# Patient Record
Sex: MALE | Race: WHITE | NOT HISPANIC OR LATINO | Employment: FULL TIME | URBAN - METROPOLITAN AREA
[De-identification: names, ages, dates, MRNs, and addresses within clinical notes are randomized per-mention and may not be internally consistent; named-entity substitution may affect disease eponyms.]

---

## 2017-02-09 ENCOUNTER — AMBULATORY - HEALTHEAST (OUTPATIENT)
Dept: ADMINISTRATIVE | Facility: REHABILITATION | Age: 52
End: 2017-02-09

## 2017-02-09 DIAGNOSIS — M25.571 RIGHT ANKLE PAIN: ICD-10-CM

## 2017-02-13 ENCOUNTER — OFFICE VISIT - HEALTHEAST (OUTPATIENT)
Dept: PHYSICAL THERAPY | Facility: REHABILITATION | Age: 52
End: 2017-02-13

## 2017-02-13 DIAGNOSIS — R26.89 GAIT, ANTALGIC: ICD-10-CM

## 2017-02-13 DIAGNOSIS — M25.571 RIGHT ANKLE PAIN: ICD-10-CM

## 2017-02-13 DIAGNOSIS — M25.571 ACUTE RIGHT ANKLE PAIN: ICD-10-CM

## 2017-02-13 DIAGNOSIS — M25.471 RIGHT ANKLE SWELLING: ICD-10-CM

## 2017-02-13 DIAGNOSIS — R29.898 ANKLE WEAKNESS: ICD-10-CM

## 2017-02-13 DIAGNOSIS — M25.673 DECREASED ROM OF ANKLE: ICD-10-CM

## 2017-02-16 ENCOUNTER — OFFICE VISIT - HEALTHEAST (OUTPATIENT)
Dept: PHYSICAL THERAPY | Facility: REHABILITATION | Age: 52
End: 2017-02-16

## 2017-02-16 DIAGNOSIS — M25.673 DECREASED ROM OF ANKLE: ICD-10-CM

## 2017-02-16 DIAGNOSIS — R29.898 ANKLE WEAKNESS: ICD-10-CM

## 2017-02-16 DIAGNOSIS — R26.89 GAIT, ANTALGIC: ICD-10-CM

## 2017-02-16 DIAGNOSIS — M25.471 RIGHT ANKLE SWELLING: ICD-10-CM

## 2017-02-16 DIAGNOSIS — M25.571 ACUTE RIGHT ANKLE PAIN: ICD-10-CM

## 2017-02-21 ENCOUNTER — OFFICE VISIT - HEALTHEAST (OUTPATIENT)
Dept: PHYSICAL THERAPY | Facility: REHABILITATION | Age: 52
End: 2017-02-21

## 2017-02-21 DIAGNOSIS — R29.898 ANKLE WEAKNESS: ICD-10-CM

## 2017-02-21 DIAGNOSIS — M25.673 DECREASED ROM OF ANKLE: ICD-10-CM

## 2017-02-21 DIAGNOSIS — R26.89 GAIT, ANTALGIC: ICD-10-CM

## 2017-02-21 DIAGNOSIS — M25.471 RIGHT ANKLE SWELLING: ICD-10-CM

## 2017-02-21 DIAGNOSIS — M25.571 ACUTE RIGHT ANKLE PAIN: ICD-10-CM

## 2017-02-23 ENCOUNTER — OFFICE VISIT - HEALTHEAST (OUTPATIENT)
Dept: PHYSICAL THERAPY | Facility: REHABILITATION | Age: 52
End: 2017-02-23

## 2017-02-23 DIAGNOSIS — M25.471 RIGHT ANKLE SWELLING: ICD-10-CM

## 2017-02-23 DIAGNOSIS — R26.89 GAIT, ANTALGIC: ICD-10-CM

## 2017-02-23 DIAGNOSIS — R29.898 ANKLE WEAKNESS: ICD-10-CM

## 2017-02-23 DIAGNOSIS — M25.571 ACUTE RIGHT ANKLE PAIN: ICD-10-CM

## 2017-02-23 DIAGNOSIS — M25.673 DECREASED ROM OF ANKLE: ICD-10-CM

## 2017-03-02 ENCOUNTER — OFFICE VISIT - HEALTHEAST (OUTPATIENT)
Dept: PHYSICAL THERAPY | Facility: REHABILITATION | Age: 52
End: 2017-03-02

## 2017-03-02 DIAGNOSIS — R26.89 GAIT, ANTALGIC: ICD-10-CM

## 2017-03-02 DIAGNOSIS — M25.673 DECREASED ROM OF ANKLE: ICD-10-CM

## 2017-03-02 DIAGNOSIS — M25.471 RIGHT ANKLE SWELLING: ICD-10-CM

## 2017-03-02 DIAGNOSIS — M25.571 ACUTE RIGHT ANKLE PAIN: ICD-10-CM

## 2017-03-02 DIAGNOSIS — R29.898 ANKLE WEAKNESS: ICD-10-CM

## 2017-03-09 ENCOUNTER — OFFICE VISIT - HEALTHEAST (OUTPATIENT)
Dept: PHYSICAL THERAPY | Facility: REHABILITATION | Age: 52
End: 2017-03-09

## 2017-03-09 DIAGNOSIS — R29.898 ANKLE WEAKNESS: ICD-10-CM

## 2017-03-09 DIAGNOSIS — M25.571 ACUTE RIGHT ANKLE PAIN: ICD-10-CM

## 2017-03-09 DIAGNOSIS — R26.89 GAIT, ANTALGIC: ICD-10-CM

## 2017-03-09 DIAGNOSIS — M25.471 RIGHT ANKLE SWELLING: ICD-10-CM

## 2017-03-09 DIAGNOSIS — M25.673 DECREASED ROM OF ANKLE: ICD-10-CM

## 2017-03-13 ENCOUNTER — OFFICE VISIT - HEALTHEAST (OUTPATIENT)
Dept: FAMILY MEDICINE | Facility: CLINIC | Age: 52
End: 2017-03-13

## 2017-03-13 DIAGNOSIS — I10 ESSENTIAL HYPERTENSION: ICD-10-CM

## 2017-03-16 ENCOUNTER — OFFICE VISIT - HEALTHEAST (OUTPATIENT)
Dept: PHYSICAL THERAPY | Facility: REHABILITATION | Age: 52
End: 2017-03-16

## 2017-03-16 DIAGNOSIS — M25.673 DECREASED ROM OF ANKLE: ICD-10-CM

## 2017-03-16 DIAGNOSIS — M25.571 ACUTE RIGHT ANKLE PAIN: ICD-10-CM

## 2017-03-16 DIAGNOSIS — R29.898 ANKLE WEAKNESS: ICD-10-CM

## 2017-03-16 DIAGNOSIS — M25.471 RIGHT ANKLE SWELLING: ICD-10-CM

## 2017-03-16 DIAGNOSIS — R26.89 GAIT, ANTALGIC: ICD-10-CM

## 2017-04-04 ENCOUNTER — OFFICE VISIT - HEALTHEAST (OUTPATIENT)
Dept: PHYSICAL THERAPY | Facility: REHABILITATION | Age: 52
End: 2017-04-04

## 2017-04-04 DIAGNOSIS — M25.471 RIGHT ANKLE SWELLING: ICD-10-CM

## 2017-04-04 DIAGNOSIS — M25.673 DECREASED ROM OF ANKLE: ICD-10-CM

## 2017-04-04 DIAGNOSIS — R29.898 ANKLE WEAKNESS: ICD-10-CM

## 2017-04-04 DIAGNOSIS — M25.571 ACUTE RIGHT ANKLE PAIN: ICD-10-CM

## 2017-04-04 DIAGNOSIS — R26.89 GAIT, ANTALGIC: ICD-10-CM

## 2017-06-07 ENCOUNTER — COMMUNICATION - HEALTHEAST (OUTPATIENT)
Dept: FAMILY MEDICINE | Facility: CLINIC | Age: 52
End: 2017-06-07

## 2017-06-07 ENCOUNTER — AMBULATORY - HEALTHEAST (OUTPATIENT)
Dept: FAMILY MEDICINE | Facility: CLINIC | Age: 52
End: 2017-06-07

## 2017-06-07 DIAGNOSIS — I10 ESSENTIAL HYPERTENSION: ICD-10-CM

## 2017-08-29 ENCOUNTER — OFFICE VISIT - HEALTHEAST (OUTPATIENT)
Dept: FAMILY MEDICINE | Facility: CLINIC | Age: 52
End: 2017-08-29

## 2017-08-29 DIAGNOSIS — I10 ESSENTIAL HYPERTENSION: ICD-10-CM

## 2017-09-14 ENCOUNTER — OFFICE VISIT - HEALTHEAST (OUTPATIENT)
Dept: FAMILY MEDICINE | Facility: CLINIC | Age: 52
End: 2017-09-14

## 2017-09-14 DIAGNOSIS — S61.213A LACERATION OF LEFT MIDDLE FINGER: ICD-10-CM

## 2018-09-17 ENCOUNTER — COMMUNICATION - HEALTHEAST (OUTPATIENT)
Dept: FAMILY MEDICINE | Facility: CLINIC | Age: 53
End: 2018-09-17

## 2018-09-17 DIAGNOSIS — I10 ESSENTIAL HYPERTENSION: ICD-10-CM

## 2018-10-25 ENCOUNTER — OFFICE VISIT - HEALTHEAST (OUTPATIENT)
Dept: FAMILY MEDICINE | Facility: CLINIC | Age: 53
End: 2018-10-25

## 2018-10-25 DIAGNOSIS — I10 HTN (HYPERTENSION): ICD-10-CM

## 2018-10-25 DIAGNOSIS — Z12.5 SCREENING FOR PROSTATE CANCER: ICD-10-CM

## 2018-10-31 ENCOUNTER — AMBULATORY - HEALTHEAST (OUTPATIENT)
Dept: LAB | Facility: CLINIC | Age: 53
End: 2018-10-31

## 2018-10-31 DIAGNOSIS — Z12.5 SCREENING FOR PROSTATE CANCER: ICD-10-CM

## 2018-10-31 DIAGNOSIS — I10 HTN (HYPERTENSION): ICD-10-CM

## 2018-10-31 LAB
ALBUMIN SERPL-MCNC: 4.3 G/DL (ref 3.5–5)
ALP SERPL-CCNC: 67 U/L (ref 45–120)
ALT SERPL W P-5'-P-CCNC: 19 U/L (ref 0–45)
ANION GAP SERPL CALCULATED.3IONS-SCNC: 11 MMOL/L (ref 5–18)
AST SERPL W P-5'-P-CCNC: 18 U/L (ref 0–40)
BILIRUB SERPL-MCNC: 1.3 MG/DL (ref 0–1)
BUN SERPL-MCNC: 13 MG/DL (ref 8–22)
CALCIUM SERPL-MCNC: 9.9 MG/DL (ref 8.5–10.5)
CHLORIDE BLD-SCNC: 105 MMOL/L (ref 98–107)
CHOLEST SERPL-MCNC: 191 MG/DL
CO2 SERPL-SCNC: 25 MMOL/L (ref 22–31)
CREAT SERPL-MCNC: 1.18 MG/DL (ref 0.7–1.3)
FASTING STATUS PATIENT QL REPORTED: YES
GFR SERPL CREATININE-BSD FRML MDRD: >60 ML/MIN/1.73M2
GLUCOSE BLD-MCNC: 95 MG/DL (ref 70–125)
HDLC SERPL-MCNC: 50 MG/DL
LDLC SERPL CALC-MCNC: 122 MG/DL
POTASSIUM BLD-SCNC: 4.4 MMOL/L (ref 3.5–5)
PROT SERPL-MCNC: 6.9 G/DL (ref 6–8)
PSA SERPL-MCNC: 0.9 NG/ML (ref 0–3.5)
SODIUM SERPL-SCNC: 141 MMOL/L (ref 136–145)
TRIGL SERPL-MCNC: 93 MG/DL

## 2018-12-21 ENCOUNTER — COMMUNICATION - HEALTHEAST (OUTPATIENT)
Dept: FAMILY MEDICINE | Facility: CLINIC | Age: 53
End: 2018-12-21

## 2018-12-21 DIAGNOSIS — I10 ESSENTIAL HYPERTENSION: ICD-10-CM

## 2019-03-22 ENCOUNTER — COMMUNICATION - HEALTHEAST (OUTPATIENT)
Dept: FAMILY MEDICINE | Facility: CLINIC | Age: 54
End: 2019-03-22

## 2019-03-22 DIAGNOSIS — I10 ESSENTIAL HYPERTENSION: ICD-10-CM

## 2019-06-19 ENCOUNTER — COMMUNICATION - HEALTHEAST (OUTPATIENT)
Dept: FAMILY MEDICINE | Facility: CLINIC | Age: 54
End: 2019-06-19

## 2019-06-19 DIAGNOSIS — I10 ESSENTIAL HYPERTENSION: ICD-10-CM

## 2019-10-07 ENCOUNTER — COMMUNICATION - HEALTHEAST (OUTPATIENT)
Dept: FAMILY MEDICINE | Facility: CLINIC | Age: 54
End: 2019-10-07

## 2019-10-07 DIAGNOSIS — I10 ESSENTIAL HYPERTENSION: ICD-10-CM

## 2019-10-21 ENCOUNTER — OFFICE VISIT - HEALTHEAST (OUTPATIENT)
Dept: FAMILY MEDICINE | Facility: CLINIC | Age: 54
End: 2019-10-21

## 2019-10-21 ENCOUNTER — COMMUNICATION - HEALTHEAST (OUTPATIENT)
Dept: FAMILY MEDICINE | Facility: CLINIC | Age: 54
End: 2019-10-21
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
## 2019-10-21 DIAGNOSIS — Z13.228 SCREENING FOR METABOLIC DISORDER: ICD-10-CM

## 2019-10-21 DIAGNOSIS — Z12.5 SCREENING FOR PROSTATE CANCER: ICD-10-CM

## 2019-10-21 DIAGNOSIS — I10 ESSENTIAL HYPERTENSION: ICD-10-CM

## 2019-10-21 LAB
ANION GAP SERPL CALCULATED.3IONS-SCNC: 9 MMOL/L (ref 5–18)
BUN SERPL-MCNC: 14 MG/DL (ref 8–22)
CALCIUM SERPL-MCNC: 9.8 MG/DL (ref 8.5–10.5)
CHLORIDE BLD-SCNC: 107 MMOL/L (ref 98–107)
CHOLEST SERPL-MCNC: 218 MG/DL
CO2 SERPL-SCNC: 27 MMOL/L (ref 22–31)
CREAT SERPL-MCNC: 1.13 MG/DL (ref 0.7–1.3)
FASTING STATUS PATIENT QL REPORTED: ABNORMAL
GFR SERPL CREATININE-BSD FRML MDRD: >60 ML/MIN/1.73M2
GLUCOSE BLD-MCNC: 93 MG/DL (ref 70–125)
HBA1C MFR BLD: 5.2 % (ref 3.5–6)
HDLC SERPL-MCNC: 43 MG/DL
LDLC SERPL CALC-MCNC: 149 MG/DL
POTASSIUM BLD-SCNC: 4.3 MMOL/L (ref 3.5–5)
PSA SERPL-MCNC: 0.8 NG/ML (ref 0–3.5)
SODIUM SERPL-SCNC: 143 MMOL/L (ref 136–145)
TRIGL SERPL-MCNC: 132 MG/DL

## 2019-10-21 ASSESSMENT — ANXIETY QUESTIONNAIRES
2. NOT BEING ABLE TO STOP OR CONTROL WORRYING: NOT AT ALL
4. TROUBLE RELAXING: NOT AT ALL
IF YOU CHECKED OFF ANY PROBLEMS ON THIS QUESTIONNAIRE, HOW DIFFICULT HAVE THESE PROBLEMS MADE IT FOR YOU TO DO YOUR WORK, TAKE CARE OF THINGS AT HOME, OR GET ALONG WITH OTHER PEOPLE: NOT DIFFICULT AT ALL
6. BECOMING EASILY ANNOYED OR IRRITABLE: NOT AT ALL
5. BEING SO RESTLESS THAT IT IS HARD TO SIT STILL: NOT AT ALL
3. WORRYING TOO MUCH ABOUT DIFFERENT THINGS: NOT AT ALL
GAD7 TOTAL SCORE: 0
1. FEELING NERVOUS, ANXIOUS, OR ON EDGE: NOT AT ALL
7. FEELING AFRAID AS IF SOMETHING AWFUL MIGHT HAPPEN: NOT AT ALL

## 2019-10-21 ASSESSMENT — PATIENT HEALTH QUESTIONNAIRE - PHQ9: SUM OF ALL RESPONSES TO PHQ QUESTIONS 1-9: 0

## 2019-10-21 ASSESSMENT — MIFFLIN-ST. JEOR: SCORE: 1823.19

## 2019-10-22 ENCOUNTER — OFFICE VISIT - HEALTHEAST (OUTPATIENT)
Dept: FAMILY MEDICINE | Facility: CLINIC | Age: 54
End: 2019-10-22

## 2019-10-22 DIAGNOSIS — Z00.00 ROUTINE GENERAL MEDICAL EXAMINATION AT A HEALTH CARE FACILITY: ICD-10-CM

## 2019-10-22 DIAGNOSIS — Z23 TETANUS-DIPHTHERIA (TD) VACCINATION: ICD-10-CM

## 2019-10-22 LAB
HCV AB SERPL QL IA: NEGATIVE
HIV 1+2 AB+HIV1 P24 AG SERPL QL IA: NEGATIVE

## 2019-10-22 ASSESSMENT — MIFFLIN-ST. JEOR: SCORE: 1821.69

## 2019-10-24 LAB
B BURGDOR DNA SPEC QL NAA+PROBE: NOT DETECTED
SPECIMEN SOURCE: NORMAL

## 2019-12-10 ENCOUNTER — COMMUNICATION - HEALTHEAST (OUTPATIENT)
Dept: FAMILY MEDICINE | Facility: CLINIC | Age: 54
End: 2019-12-10

## 2019-12-10 DIAGNOSIS — I10 ESSENTIAL HYPERTENSION: ICD-10-CM

## 2020-02-10 ENCOUNTER — COMMUNICATION - HEALTHEAST (OUTPATIENT)
Dept: SCHEDULING | Facility: CLINIC | Age: 55
End: 2020-02-10

## 2020-02-10 ENCOUNTER — OFFICE VISIT - HEALTHEAST (OUTPATIENT)
Dept: FAMILY MEDICINE | Facility: CLINIC | Age: 55
End: 2020-02-10

## 2020-02-10 DIAGNOSIS — E78.49 OTHER HYPERLIPIDEMIA: ICD-10-CM

## 2020-02-10 DIAGNOSIS — J40 BRONCHITIS: ICD-10-CM

## 2020-02-10 LAB
FLUAV AG SPEC QL IA: NORMAL
FLUBV AG SPEC QL IA: NORMAL

## 2020-02-10 ASSESSMENT — MIFFLIN-ST. JEOR: SCORE: 1862.51

## 2020-02-10 NOTE — ASSESSMENT & PLAN NOTE
Patient currently with ASCVD score of over 8%.  Discussed with patient that we can look at further lowering blood pressure [id number], starting a moderate dose statin, working on weight loss and rechecking in 3 to 4 months.    Father  of MI at age 50 for which patient is now at the age of 54.  Discussed with patient to that this is not something to continue to sit on for the next 2 to 3 years.  He would like to try and go without medication.  Discussed avenues for weight loss and exercise to help reduce.  Also discussed the role of treatment now is to prevent problems in the next 5 to 10 years.    Additionally discussed with patient about possibly getting a CT cardiac coronary calcium score to help determine further risks given his status.  Patient will check with his insurance company.    Recommended patient move forward with his plans for weight loss and exercise now and follow-up with his PCP in 3 to 4 months.

## 2020-02-10 NOTE — ASSESSMENT & PLAN NOTE
Discussed symptomatic care currently.  Influenza tests are negative.  If symptoms are not improving or fevers not improving in 4 daysthen start Augmentin as per orders.

## 2020-11-18 ENCOUNTER — AMBULATORY - HEALTHEAST (OUTPATIENT)
Dept: FAMILY MEDICINE | Facility: CLINIC | Age: 55
End: 2020-11-18

## 2020-11-18 ENCOUNTER — OFFICE VISIT - HEALTHEAST (OUTPATIENT)
Dept: FAMILY MEDICINE | Facility: CLINIC | Age: 55
End: 2020-11-18

## 2020-11-18 DIAGNOSIS — Z20.822 SUSPECTED COVID-19 VIRUS INFECTION: ICD-10-CM

## 2020-11-20 ENCOUNTER — COMMUNICATION - HEALTHEAST (OUTPATIENT)
Dept: SCHEDULING | Facility: CLINIC | Age: 55
End: 2020-11-20

## 2020-12-07 ENCOUNTER — OFFICE VISIT - HEALTHEAST (OUTPATIENT)
Dept: FAMILY MEDICINE | Facility: CLINIC | Age: 55
End: 2020-12-07

## 2020-12-07 DIAGNOSIS — E66.01 MORBID OBESITY (H): ICD-10-CM

## 2020-12-07 DIAGNOSIS — E78.49 OTHER HYPERLIPIDEMIA: ICD-10-CM

## 2020-12-07 DIAGNOSIS — I10 ESSENTIAL HYPERTENSION: ICD-10-CM

## 2020-12-07 DIAGNOSIS — Z12.5 SCREENING FOR PROSTATE CANCER: ICD-10-CM

## 2020-12-07 DIAGNOSIS — Z00.00 ROUTINE GENERAL MEDICAL EXAMINATION AT A HEALTH CARE FACILITY: ICD-10-CM

## 2020-12-07 DIAGNOSIS — Z12.11 SPECIAL SCREENING FOR MALIGNANT NEOPLASMS, COLON: ICD-10-CM

## 2020-12-07 DIAGNOSIS — Z13.228 SCREENING FOR METABOLIC DISORDER: ICD-10-CM

## 2020-12-07 LAB
ANION GAP SERPL CALCULATED.3IONS-SCNC: 8 MMOL/L (ref 5–18)
BUN SERPL-MCNC: 11 MG/DL (ref 8–22)
CALCIUM SERPL-MCNC: 9.3 MG/DL (ref 8.5–10.5)
CHLORIDE BLD-SCNC: 105 MMOL/L (ref 98–107)
CHOLEST SERPL-MCNC: 209 MG/DL
CO2 SERPL-SCNC: 28 MMOL/L (ref 22–31)
CREAT SERPL-MCNC: 1.12 MG/DL (ref 0.7–1.3)
FASTING STATUS PATIENT QL REPORTED: YES
GFR SERPL CREATININE-BSD FRML MDRD: >60 ML/MIN/1.73M2
GLUCOSE BLD-MCNC: 100 MG/DL (ref 70–125)
HDLC SERPL-MCNC: 43 MG/DL
LDLC SERPL CALC-MCNC: 145 MG/DL
POTASSIUM BLD-SCNC: 4.8 MMOL/L (ref 3.5–5)
PSA SERPL-MCNC: 1 NG/ML (ref 0–3.5)
SODIUM SERPL-SCNC: 141 MMOL/L (ref 136–145)
TRIGL SERPL-MCNC: 104 MG/DL

## 2020-12-07 RX ORDER — AMLODIPINE BESYLATE 10 MG/1
10 TABLET ORAL DAILY
Qty: 90 TABLET | Refills: 4 | Status: SHIPPED | OUTPATIENT
Start: 2020-12-07 | End: 2022-03-08

## 2020-12-07 ASSESSMENT — ANXIETY QUESTIONNAIRES
3. WORRYING TOO MUCH ABOUT DIFFERENT THINGS: SEVERAL DAYS
2. NOT BEING ABLE TO STOP OR CONTROL WORRYING: NOT AT ALL
6. BECOMING EASILY ANNOYED OR IRRITABLE: SEVERAL DAYS
GAD7 TOTAL SCORE: 4
5. BEING SO RESTLESS THAT IT IS HARD TO SIT STILL: NOT AT ALL
1. FEELING NERVOUS, ANXIOUS, OR ON EDGE: SEVERAL DAYS
7. FEELING AFRAID AS IF SOMETHING AWFUL MIGHT HAPPEN: NOT AT ALL
IF YOU CHECKED OFF ANY PROBLEMS ON THIS QUESTIONNAIRE, HOW DIFFICULT HAVE THESE PROBLEMS MADE IT FOR YOU TO DO YOUR WORK, TAKE CARE OF THINGS AT HOME, OR GET ALONG WITH OTHER PEOPLE: SOMEWHAT DIFFICULT
4. TROUBLE RELAXING: SEVERAL DAYS

## 2020-12-07 ASSESSMENT — MIFFLIN-ST. JEOR: SCORE: 1848.79

## 2020-12-07 ASSESSMENT — PATIENT HEALTH QUESTIONNAIRE - PHQ9: SUM OF ALL RESPONSES TO PHQ QUESTIONS 1-9: 1

## 2021-02-08 ENCOUNTER — OFFICE VISIT - HEALTHEAST (OUTPATIENT)
Dept: FAMILY MEDICINE | Facility: CLINIC | Age: 56
End: 2021-02-08

## 2021-02-08 DIAGNOSIS — M25.531 RIGHT WRIST PAIN: ICD-10-CM

## 2021-02-08 DIAGNOSIS — M25.511 ACUTE PAIN OF RIGHT SHOULDER: ICD-10-CM

## 2021-02-08 ASSESSMENT — MIFFLIN-ST. JEOR: SCORE: 1878.28

## 2021-02-23 ENCOUNTER — RECORDS - HEALTHEAST (OUTPATIENT)
Dept: ADMINISTRATIVE | Facility: OTHER | Age: 56
End: 2021-02-23

## 2021-05-21 ENCOUNTER — RECORDS - HEALTHEAST (OUTPATIENT)
Dept: ADMINISTRATIVE | Facility: OTHER | Age: 56
End: 2021-05-21

## 2021-05-26 ASSESSMENT — PATIENT HEALTH QUESTIONNAIRE - PHQ9
SUM OF ALL RESPONSES TO PHQ QUESTIONS 1-9: 0
SUM OF ALL RESPONSES TO PHQ QUESTIONS 1-9: 1

## 2021-05-26 NOTE — TELEPHONE ENCOUNTER
Refill Approved    Rx renewed per Medication Renewal Policy. Medication was last renewed on 12/26/18  OV 10/25/18.    Cony Jones, [facility] Triage/Med Refill 3/23/2019     Requested Prescriptions   Pending Prescriptions Disp Refills     amLODIPine (NORVASC) 5 MG tablet [Pharmacy Med Name: AMLODIPINE 5MG TAB] 90 tablet 0     Sig: TAKE 1 TABLET BY MOUTH ONCE DAILY ,  NEED  ANNUAL  EXAM  FOR  FURTHER  REFILLS    Calcium-Channel Blockers Protocol Passed - 3/22/2019  7:22 AM       Passed - PCP or prescribing provider visit in past 12 months or next 3 months    Last office visit with prescriber/PCP: Visit date not found OR same dept: 10/25/2018 Jf Aguirre MD OR same specialty: 10/25/2018 Jf Aguirre MD  Last physical: Visit date not found Last MTM visit: Visit date not found   Next visit within 3 mo: Visit date not found  Next physical within 3 mo: Visit date not found  Prescriber OR PCP: Dahiana Scott MD  Last diagnosis associated with med order: 1. Essential hypertension  - amLODIPine (NORVASC) 5 MG tablet [Pharmacy Med Name: AMLODIPINE 5MG TAB]; TAKE 1 TABLET BY MOUTH ONCE DAILY ,  NEED  ANNUAL  EXAM  FOR  FURTHER  REFILLS  Dispense: 90 tablet; Refill: 0    If protocol passes may refill for 12 months if within 3 months of last provider visit (or a total of 15 months).            Passed - Blood pressure filed in past 12 months    BP Readings from Last 1 Encounters:   10/25/18 136/88

## 2021-05-28 ASSESSMENT — ANXIETY QUESTIONNAIRES
GAD7 TOTAL SCORE: 0
GAD7 TOTAL SCORE: 4

## 2021-05-29 NOTE — TELEPHONE ENCOUNTER
Refill Approved    Rx renewed per Medication Renewal Policy. Medication was last renewed on 3/24/19.    Ifrah Orlando, [facility] Triage/Med Refill 6/20/2019     Requested Prescriptions   Pending Prescriptions Disp Refills     amLODIPine (NORVASC) 5 MG tablet [Pharmacy Med Name: AMLODIPINE 5MG TAB] 90 tablet 0     Sig: TAKE 1 TABLET BY MOUTH ONCE DAILY . APPOINTMENT REQUIRED FOR FUTURE REFILLS       Calcium-Channel Blockers Protocol Passed - 6/19/2019  7:16 AM        Passed - PCP or prescribing provider visit in past 12 months or next 3 months     Last office visit with prescriber/PCP: 8/29/2017 Raheem Ríos MD OR same dept: 10/25/2018 Jf Aguirre MD OR same specialty: 10/25/2018 Jf Aguirre MD  Last physical: 12/12/2016 Last MTM visit: Visit date not found   Next visit within 3 mo: Visit date not found  Next physical within 3 mo: Visit date not found  Prescriber OR PCP: Raheem Ríos MD  Last diagnosis associated with med order: 1. Essential hypertension  - amLODIPine (NORVASC) 5 MG tablet [Pharmacy Med Name: AMLODIPINE 5MG TAB]; TAKE 1 TABLET BY MOUTH ONCE DAILY . APPOINTMENT REQUIRED FOR FUTURE REFILLS  Dispense: 90 tablet; Refill: 0    If protocol passes may refill for 12 months if within 3 months of last provider visit (or a total of 15 months).             Passed - Blood pressure filed in past 12 months     BP Readings from Last 1 Encounters:   10/25/18 136/88

## 2021-05-30 VITALS — WEIGHT: 242 LBS | BODY MASS INDEX: 37.9 KG/M2

## 2021-05-31 VITALS — WEIGHT: 231.2 LBS | BODY MASS INDEX: 36.21 KG/M2

## 2021-05-31 VITALS — WEIGHT: 229.5 LBS | BODY MASS INDEX: 35.94 KG/M2

## 2021-06-02 VITALS — WEIGHT: 231 LBS | BODY MASS INDEX: 36.18 KG/M2

## 2021-06-02 NOTE — TELEPHONE ENCOUNTER
RN cannot approve Refill Request    RN can NOT refill this medication PCP messaged that patient is overdue for six month f/u BP and Protocol failed and RN gave two month refill. Last office visit: 8/29/2017 Raheem Ríos MD Last Physical: 12/12/2016 Last MTM visit: Visit date not found Last visit same specialty: 10/25/2018 Jf Aguirre MD.  Next visit within 3 mo: Visit date not found  Next physical within 3 mo: Visit date not found  Last OV 10/25/2018  Last refill 6/20/2019 for 90/0 with note to be seen  Sonia Mcneill, [facility] Triage/Med Refill 10/9/2019    Requested Prescriptions   Pending Prescriptions Disp Refills     amLODIPine (NORVASC) 5 MG tablet [Pharmacy Med Name: AMLODIPINE 5MG TAB] 90 tablet 0     Sig: TAKE 1 TABLET BY MOUTH ONCE DAILY . APPOINTMENT REQUIRED FOR FUTURE REFILLS       Calcium-Channel Blockers Protocol Passed - 10/7/2019  1:35 PM        Passed - PCP or prescribing provider visit in past 12 months or next 3 months     Last office visit with prescriber/PCP: 8/29/2017 Raheem Ríos MD OR same dept: 10/25/2018 Jf Aguirre MD OR same specialty: 10/25/2018 Jf Aguirre MD  Last physical: 12/12/2016 Last MTM visit: Visit date not found   Next visit within 3 mo: Visit date not found  Next physical within 3 mo: Visit date not found  Prescriber OR PCP: Raheem Ríos MD  Last diagnosis associated with med order: 1. Essential hypertension  - amLODIPine (NORVASC) 5 MG tablet [Pharmacy Med Name: AMLODIPINE 5MG TAB]; TAKE 1 TABLET BY MOUTH ONCE DAILY . APPOINTMENT REQUIRED FOR FUTURE REFILLS  Dispense: 90 tablet; Refill: 0    If protocol passes may refill for 12 months if within 3 months of last provider visit (or a total of 15 months).             Passed - Blood pressure filed in past 12 months     BP Readings from Last 1 Encounters:   10/25/18 136/88

## 2021-06-02 NOTE — TELEPHONE ENCOUNTER
Medication Question or Clarification  Who is calling: Pharmacy: Franci from [facility]  What medication are you calling about? (include dose and sig)     doxycycline (MONODOX) 100 MG capsule 20 capsule 0 10/21/2019     Si tab to be taken X1      Who prescribed the medication?: Dahiana Scott MD  What is your question/concern?: Sig does not match the quantity, please advise!  Pharmacy: [facility]  Okay to leave a detailed message?: Yes  Site CMT - Please call the pharmacy to obtain any additional needed information.

## 2021-06-03 VITALS
WEIGHT: 226.4 LBS | DIASTOLIC BLOOD PRESSURE: 84 MMHG | HEIGHT: 67 IN | SYSTOLIC BLOOD PRESSURE: 128 MMHG | HEART RATE: 68 BPM | BODY MASS INDEX: 35.53 KG/M2

## 2021-06-03 VITALS
BODY MASS INDEX: 34.25 KG/M2 | WEIGHT: 226 LBS | SYSTOLIC BLOOD PRESSURE: 130 MMHG | DIASTOLIC BLOOD PRESSURE: 78 MMHG | HEART RATE: 66 BPM | HEIGHT: 68 IN | OXYGEN SATURATION: 98 %

## 2021-06-04 VITALS
HEIGHT: 68 IN | HEART RATE: 64 BPM | TEMPERATURE: 98.6 F | RESPIRATION RATE: 16 BRPM | DIASTOLIC BLOOD PRESSURE: 88 MMHG | SYSTOLIC BLOOD PRESSURE: 138 MMHG | WEIGHT: 235 LBS | OXYGEN SATURATION: 98 % | BODY MASS INDEX: 35.61 KG/M2

## 2021-06-04 NOTE — TELEPHONE ENCOUNTER
Refill Approved    Rx renewed per Medication Renewal Policy. Medication was last renewed on 10/9/19.    Eladia Rose, [facility] Triage/Med Refill 12/11/2019     Requested Prescriptions   Pending Prescriptions Disp Refills     amLODIPine (NORVASC) 5 MG tablet [Pharmacy Med Name: AMLODIPINE 5MG TAB] 30 tablet 1     Sig: TAKE 1 TABLET BY MOUTH ONCE DAILY. APPOINTMENT REQUIRED FOR FUTURE REFILLS       Calcium-Channel Blockers Protocol Passed - 12/10/2019  7:35 AM        Passed - PCP or prescribing provider visit in past 12 months or next 3 months     Last office visit with prescriber/PCP: 8/29/2017 Raheem Ríos MD OR same dept: 10/21/2019 Dahiana Scott MD OR same specialty: 10/21/2019 Dahiana Scott MD  Last physical: 10/22/2019 Last MTM visit: Visit date not found   Next visit within 3 mo: Visit date not found  Next physical within 3 mo: Visit date not found  Prescriber OR PCP: Raheem Ríos MD  Last diagnosis associated with med order: 1. Essential hypertension  - amLODIPine (NORVASC) 5 MG tablet [Pharmacy Med Name: AMLODIPINE 5MG TAB]; TAKE 1 TABLET BY MOUTH ONCE DAILY . APPOINTMENT REQUIRED FOR FUTURE REFILLS  Dispense: 30 tablet; Refill: 1    If protocol passes may refill for 12 months if within 3 months of last provider visit (or a total of 15 months).             Passed - Blood pressure filed in past 12 months     BP Readings from Last 1 Encounters:   10/22/19 130/78

## 2021-06-05 VITALS
WEIGHT: 237.6 LBS | HEIGHT: 68 IN | DIASTOLIC BLOOD PRESSURE: 84 MMHG | HEART RATE: 74 BPM | SYSTOLIC BLOOD PRESSURE: 130 MMHG | BODY MASS INDEX: 36.01 KG/M2 | TEMPERATURE: 98.1 F

## 2021-06-05 VITALS
OXYGEN SATURATION: 97 % | HEART RATE: 57 BPM | DIASTOLIC BLOOD PRESSURE: 88 MMHG | HEIGHT: 68 IN | SYSTOLIC BLOOD PRESSURE: 136 MMHG | WEIGHT: 231.1 LBS | BODY MASS INDEX: 35.03 KG/M2

## 2021-06-05 NOTE — PROGRESS NOTES
Assessment/Plan:     Problem List Items Addressed This Visit     Hyperlipidemia     Patient currently with ASCVD score of over 8%.  Discussed with patient that we can look at further lowering blood pressure underneath 130, starting a moderate dose statin, working on weight loss and rechecking in 3 to 4 months.    Father  of MI at age 50 for which patient is now at the age of 54.  Discussed with patient to that this is not something to continue to sit on for the next 2 to 3 years.  He would like to try and go without medication.  Discussed avenues for weight loss and exercise to help reduce.  Also discussed the role of treatment now is to prevent problems in the next 5 to 10 years.    Additionally discussed with patient about possibly getting a CT cardiac coronary calcium score to help determine further risks given his status.  Patient will check with his insurance company.    Recommended patient move forward with his plans for weight loss and exercise now and follow-up with his PCP in 3 to 4 months.         Bronchitis - Primary     Discussed symptomatic care currently.  Influenza tests are negative.  If symptoms are not improving or fevers not improving in 4 days then start Augmentin as per orders.         Relevant Medications    amoxicillin-clavulanate (AUGMENTIN) 875-125 mg per tablet    benzonatate (TESSALON PERLES) 100 MG capsule    Other Relevant Orders    Influenza A/B Rapid Test- Nasal Swab (Completed)        Return in about 2 months (around 2020) for per PCP recommendation at last physical 10/22/2019.    Subjective:   54 y.o. male presents for cold and myalgias for the past 3 days.  Patient is worried for influenza.  Cough which is basically through the day and night.  He has taken some NyQuil which helps him sleep through the night and also seems to help with cough during the day.  No shortness of breath or dyspnea on exertion.  Does not like taking medication.  Blood pressures been staying under  "140/90.    On reviewing the chart it is noted that his ASCVD was elevated back to the time of his annual physical and with recheck lipids still elevated.  Patient's family history significant for MI in his father at the age of 54 and his mother with an MI at the age of 85.  Patient is a non-smoker.  He has gained weight in the past year and had been down to 215 by his home scale.  He and his wife have been planning to get back into better eating habits and continuing his current vigorous exercise, but they just have not moved in that direction yet      The following high BMI interventions were performed this visit: encouragement to exercise and weight monitoring      Review of Systems   HENT: Positive for postnasal drip. Negative for ear discharge, ear pain, facial swelling, hearing loss and mouth sores.    Eyes: Negative for redness and visual disturbance.   Respiratory: Positive for cough. Negative for choking, chest tightness and shortness of breath.    Cardiovascular: Negative for chest pain, palpitations and leg swelling.   Gastrointestinal: Negative for abdominal pain.   Musculoskeletal: Negative for arthralgias and myalgias.        History     Reviewed By Date/Time Sections Reviewed    Edil Mayes,  2/10/2020 10:39 AM Medical, Surgical, Tobacco, Family, Socioeconomic    Kwasi Hollins Jr., [facility] 2/10/2020 10:17 AM Tobacco    Kwasi Hollins Jr., [facility] 2/10/2020 10:13 AM Tobacco           Objective:     Vitals:    02/10/20 1011   BP: 138/88   Pulse: 64   Resp: 16   Temp: 98.6  F (37  C)   TempSrc: Oral   SpO2: 98%   Weight: (!) 235 lb (106.6 kg)   Height: 5' 7.5\" (1.715 m)     Physical Exam  Vitals signs and nursing note reviewed.   Constitutional:       General: He is not in acute distress.     Appearance: Normal appearance.   HENT:      Head: Normocephalic and atraumatic.      Right Ear: Tympanic membrane, ear canal and external ear normal.      Left Ear: Tympanic membrane, ear canal and " external ear normal.      Nose: Congestion present.      Mouth/Throat:      Pharynx: Posterior oropharyngeal erythema present. No oropharyngeal exudate.   Eyes:      Extraocular Movements: Extraocular movements intact.      Conjunctiva/sclera: Conjunctivae normal.   Neck:      Musculoskeletal: Normal range of motion.   Cardiovascular:      Rate and Rhythm: Normal rate and regular rhythm.      Pulses: Normal pulses.      Heart sounds: Normal heart sounds. No murmur. No friction rub. No gallop.    Pulmonary:      Effort: Pulmonary effort is normal.      Breath sounds: Normal breath sounds. No wheezing, rhonchi or rales.   Lymphadenopathy:      Cervical: No cervical adenopathy.   Skin:     Capillary Refill: Capillary refill takes less than 2 seconds.   Neurological:      Mental Status: He is alert and oriented to person, place, and time.   Psychiatric:         Mood and Affect: Mood normal.         The 10-year ASCVD risk score (Lindsaygary COLVIN Jr., et al., 2013) is: 8.6%    Values used to calculate the score:      Age: 54 years      Sex: Male      Is Non- : No      Diabetic: No      Tobacco smoker: No      Systolic Blood Pressure: 138 mmHg      Is BP treated: Yes      HDL Cholesterol: 43 mg/dL      Total Cholesterol: 218 mg/dL     This note has been dictated using voice recognition software. Any grammatical or context distortions are unintentional and inherent to the software

## 2021-06-05 NOTE — TELEPHONE ENCOUNTER
Fever and chills with cough and pain in lungs.    Symptoms started last Friday.    No shortness of breath.  Chest pain just when coughing.    Wants to be seen for possible bronchitis.    Transferred to scheduling for an appointment.    Stefanie Sparks RN [facility] Triage Nurse Advisor    Reason for Disposition    Fever present > 3 days (72 hours)    Protocols used: COUGH-A-OH

## 2021-06-08 NOTE — PROGRESS NOTES
[facility]   Foot/Ankle Initial Evaluation    Patient Name: Toni Mcclelland  Date of evaluation: 2/13/2017  Referral Diagnosis: right ankle pain   Referring provider: Donny Hoyt DPM  Visit Diagnosis:     ICD-10-CM    1. Right ankle pain M25.571    2. Decreased ROM of ankle M25.673    3. Right ankle swelling M25.471    4. Gait, antalgic R26.89    5. Ankle weakness M62.81        Assessment:      Pt. is appropriate for skilled PT intervention as outlined in the Plan of Care (POC).    Goals:  Pt. will demonstrate/verbalize independence in self-management of condition in : 6 weeks  Pt. will be able to walk : on uneven/inclined surfaces;for community mobility  Patient will ascend / descend: stairs;with less pain;with less difficulty;in 6 weeks  Patient will increase : LEFS score;for improved quality of function;in 6 weeks  Pt will: perform daily tasks and light housework with increased ease, ankle stability and pain <2/10; in 6 weeks     Barriers to Learning or Achieving Goals:  No Barriers.    Patient's expectations/goals are realistic.       Plan / Patient Instructions:        Plan of Care:   Communication with: Referral Source  Patient Related Instruction: Nature of Condition;Treatment plan and rationale;Self Care instruction;Basis of treatment;Body mechanics;Posture;Expected outcome  Times per Week: 2  Number of Weeks: 5  Number of Visits: 10 - PRN   Discharge Planning: Pt will be discharged upon meeting goals or plateau of progress   Therapeutic Exercise: ROM;Stretching;Strengthening  Neuromuscular Reeducation: kinesio tape;posture;balance/proprioception;core  Manual Therapy: soft tissue mobilization;myofascial release;joint mobilization;muscle energy;strain counterstrain  Modalities: electrical stimulation;TENS;ultrasound;cold pack;hot pack    Plan for next visit: focus on ROM, mobility, strength, gait, balance and proprioception      Subjective:         History of Present Illness:    Toni is a 51  y.o. male who presents to therapy today with complaints of right ankle pain and dysfunction post-operatively . Date of onset/duration of symptoms is December 15, 2016. Onset was related to surgery. Symptoms are getting better. He reports  an episodic  history of similar symptoms. He describes their previous level of function as limited with ankle stability     Pt reports that he has history of repetitive ankle sprains over his years starting from being a teenager.  Pt reports that he played hockey 3-4 time per week up until surgery.    Pt has cast for 3 weeks, then a boot and now is down to just a brace.  Pt has been off crutches for 1 week.  Pt reports occasional use of his boot with higher level actvity/uneven ground.   Pt reports that he has 2 replacement ligaments (donor) and he also had the ankle cleaned out.      Pt returns to Dr. Hoyt in 7 weeks.  (?)    Pain Ratin  Pain rating at best: 0  Pain rating at worst: 8  Pain description: sharp and shooting    Functional limitations are described as occurring with:   daily tasks  descending stairs or curbs  walking on even and uneven surfaces   hockey    Patient reports that he has been doing constant ankle movement of his ankle throughout the day.   No icing currently.      Objective:      Note: Items left blank indicates the item was not performed or not indicated at the time of the evaluation.    Patient Outcome Measures :      Lower Extremity Functional Scale (_/80): 32   Scores range from 0-80, where a score of 80 represents maximum function. The minimal clinically important difference is a positive change of 9 points.    Ankle/Foot Examination  1. Right ankle pain     2. Decreased ROM of ankle     3. Right ankle swelling     4. Gait, antalgic     5. Ankle weakness       Precautions: post-op ankle   Involved Side: Right  Posture Observation:      General standing posture is fair.  Assistive Device: ankle brace   Gait Observation: decreased  "stability on R ankle, decreased push-off, etc   Hip Clearing: Does not provoke symptoms  Knee Clearing: Does not provoke symptoms    Foot/Ankle ROM:                          Date:            2/13/17   Ankle AROM ( )   Right    Left   Right   Left   Right   Left   Dorsiflexion-Gastroc (10 )   10   12               Dorsiflexion-Soleus (20 )                     Plantar Flexion (50 )   48, p   65               Inversion (45-60 )   20, p   35               Eversion (15-30 )   12, p   12               Great Toe Extension (70 )                     Great Toe Flexion (MTP 45 , IP 90 )                     Ankle PROM ( )   Right   Left   Right   Left   Right   Left   Dorsiflexion-Gastroc (10 )   12                  Dorsiflexion-Soleus (20 )                     Plantar Flexion (50 )   50                  Inversion (45-60 )   20                  Eversion (15-30 )   12                  Great Toe Extension (70 )                     Great Toe Flexion (MTP 45 , IP 90 )                        Foot/Ankle Strength:          Date:        2/13/17   Ankle/Foot MMT (/5)   Right   Left   Right   Left   Right   Left   Dorsiflexion   4   5       Plantar flexion   4-   5       Inversion   4   5       Eversion   4   5       Great Toe Extension           Palpation:   No major tenderness     Foot/Ankle Special Tests:    NT due to recent surgery       Current HEP Exercises:  Exercise #1: Bike or Nu-step Warm-up   Comment #1: next time   Exercise #2: Ankle ROM - PF/DF, inversion.eversion, CW, CCW  Comment #2: Rx10 each   Exercise #3: Ankle A-Z   Comment #3: Rx1   Exercise #4: Ankle isometrics - PF, DF, inversion, eversion   Comment #4: Rx5 with 5\"   Exercise #5: Seated Heel/toe raises   Comment #5: Rx10       Treatment Today     TREATMENT MINUTES COMMENTS   Evaluation 25 Low    Self-care/ Home management     Manual therapy 10 Right ankle milking, ankle DF and PF mobility; foot mobility    Neuromuscular Re-education     Therapeutic Activity   "   Therapeutic Exercises 17 Pathology, POC, HEP, C/P  See flow sheet    Gait training 3 Cues for technique, heel/toe gait    Modality__________________                Total 55    Blank areas are intentional and mean the treatment did not include these items.       PT Evaluation Code: (Please list factors)  Patient History/Comorbidities: none   Examination: decreased ROM, strength, gait, balance/proprioception   Clinical Presentation: stable and uncomplicated   Clinical Decision Making: low     Patient History/  Comorbidities Examination  (body structures and functions, activity limitations, and/or participation restrictions) Clinical Presentation Clinical Decision Making (Complexity)   No documented Comorbidities or personal factors 1-2 Elements Stable and/or uncomplicated Low   1-2 documented comorbidities or personal factor 3 Elements Evolving clinical presentation with changing characteristics Moderate   3-4 documented comorbidities or personal factors 4 or more Unstable and unpredictable High          Benita Holland  2/13/2017  11:58 AM

## 2021-06-08 NOTE — PROGRESS NOTES
[facility] Daily Progress     Patient Name: Toni Mcclelland  Date: 2017  Visit #: 2  Referring provider: Donny Hoyt DPM  Visit Diagnosis:     ICD-10-CM    1. Acute right ankle pain M25.571    2. Decreased ROM of ankle M25.673    3. Right ankle swelling M25.471    4. Gait, antalgic R26.89    5. Ankle weakness M62.81          Assessment:     HEP/POC compliance is  good .  Patient demonstrates understanding/independence with home program.  Patient is benefitting from skilled physical therapy and is making steady progress toward functional goals.    Goal Status:  Pt. will demonstrate/verbalize independence in self-management of condition in : 6 weeks  Pt. will be able to walk : on uneven/inclined surfaces;for community mobility  Patient will ascend / descend: stairs;with less pain;with less difficulty;in 6 weeks  Patient will increase : LEFS score;for improved quality of function;in 6 weeks  Pt will: perform daily tasks and light housework with increased ease, ankle stability and pain <2/10; in 6 weeks     Plan / Patient Education:     Continue with initial plan of care.  Progress with home program as tolerated.    Subjective:     Pain Ratin    Pt reports that his ankle is doing well.  Pt notes that sometimes her will get up and walk and feel fine and sometimes he is more tight and sore.      Pt reports that he felt much better after he left PT with increased ankle mobility.      Pt reports that he did his exercises everyday, but not always 2-3x per day.      Objective:     Pt tolerates the exercises well with moderate cueing for technique.      Pt with continued loss of right ankle mobility, but PF was slightly improved today.      Pt with decreased right ankle and LE strength and stability.      Pt with min-mod gait deviation.      Current Exercises:  Exercises:  Exercise #1: Bike or Nu-step Warm-up   Comment #1: RL: 7 x 5 min   Exercise #2: Ankle ROM - PF/DF, inversion.eversion, CW,  "CCW  Comment #2: Rx12  each except circles   Exercise #3: Ankle A-Z   Comment #3: Rx1   Exercise #4: Ankle isometrics - PF, DF, inversion, eversion   Comment #4: Rx6 with 5\"   Exercise #5: Seated Heel/toe raises   Comment #5: Rx12    Pt demonstrates benefits from skilled PT.        Treatment Today     TREATMENT MINUTES COMMENTS   Evaluation     Self-care/ Home management     Manual therapy 12 Right ankle milking, ankle DF and PF mobility; foot mobility   Neuromuscular Re-education     Therapeutic Activity     Therapeutic Exercises 16 See flow sheet    Gait training     Modality__________________                Total 28    Blank areas are intentional and mean the treatment did not include these items.       Benita Holland, PT   2/16/2017      " Ice/Face Mask

## 2021-06-09 NOTE — PROGRESS NOTES
[facility] Daily Progress     Patient Name: Toni Mcclelland  Date: 2017  Visit #: 4  Referring provider: Donny Hoyt DPM  Visit Diagnosis:     ICD-10-CM    1. Acute right ankle pain M25.571    2. Decreased ROM of ankle M25.673    3. Right ankle swelling M25.471    4. Gait, antalgic R26.89    5. Ankle weakness M62.81          Assessment:     HEP/POC compliance is  good .  Patient demonstrates understanding/independence with home program.  Patient is benefitting from skilled physical therapy and is making steady progress toward functional goals.    Goal Status:  Pt. will demonstrate/verbalize independence in self-management of condition in : 6 weeks;Progressing toward  Pt. will be able to walk : on uneven/inclined surfaces;for community mobility  Patient will ascend / descend: stairs;with less pain;with less difficulty;in 6 weeks;Progressing toward  Patient will increase : LEFS score;for improved quality of function;in 6 weeks;Progressing toward  Pt will: perform daily tasks and light housework with increased ease, ankle stability and pain <2/10; in 6 weeks ; Progressing toward    Plan / Patient Education:     Continue with initial plan of care.  Progress with home program as tolerated.     Subjective:     Pain Ratin    Pt reports that his ankle is doing well and he notices improvements each day.  Pt reports that he just gets sore when he pushes himself.      Pt reports that he felt much better after he left PT with increased ankle mobility.      Pt reports that he did his exercises everyday, 2x per day.      Objective:     Pt tolerates the exercises well with moderate cueing for technique.      Foot/Ankle ROM:                     Date:                            17  Ankle AROM ( ) Right Left Right Left Right Left   Dorsiflexion-Gastroc (10 ) 10 12  12   NT         Dorsiflexion-Soleus (20 )                   Plantar Flexion (50 ) 48, p 65  55   NT        "  Inversion (45-60 ) 20, p 35  24   NT         Eversion (15-30 ) 12, p 12  18   NT             Pt with decreased right ankle and LE strength and stability.  However, this is improving and continues to be addressed by his HEP.      Pt with min-mod gait deviation.      Current Exercises:  Exercises:  Exercise #1: Bike or Nu-step Warm-up   Comment #1: RL: 7 x 3 min   Exercise #2: Ankle ROM - PF/DF, inversion.eversion, CW, CCW  Comment #2: BAPS Board Level 3 Rx15 each way   Exercise #3: Ankle A-Z   Comment #3: Rx1   Exercise #4: Ankle bands- PF, DF, inversion, eversion   Comment #4: orange Rx10 each - added to HEP  Exercise #5: Standing Heel/toe raises   Comment #5: reviewed   Exercise #6: tandem Stance   Comment #6: reviewed   Exercise #7: Gastroc/soleus stretch   Comment #7: Rx30\" each - added to HEP    Pt demonstrates benefits from skilled PT.        Treatment Today     TREATMENT MINUTES COMMENTS   Evaluation     Self-care/ Home management     Manual therapy 15 Right ankle milking, ankle DF and PF mobility; foot mobility   Neuromuscular Re-education     Therapeutic Activity     Therapeutic Exercises 15 See flow sheet    Gait training     Modality__________________                Total 30    Blank areas are intentional and mean the treatment did not include these items.       Benita Holland, PT   2/23/2017  "

## 2021-06-09 NOTE — PROGRESS NOTES
[facility] Daily Progress     Patient Name: Toni Mcclelland  Date: 3/9/2017  Visit #: 6  Referring provider: Donny Hoyt DPM  Visit Diagnosis:     ICD-10-CM    1. Acute right ankle pain M25.571    2. Decreased ROM of ankle M25.673    3. Right ankle swelling M25.471    4. Gait, antalgic R26.89    5. Ankle weakness M62.81          Assessment:     HEP/POC compliance is  good .  Patient demonstrates understanding/independence with home program.  Patient is benefitting from skilled physical therapy and is making steady progress toward functional goals.    Goal Status:  Pt. will demonstrate/verbalize independence in self-management of condition in : 6 weeks;Progressing toward  Pt. will be able to walk : on uneven/inclined surfaces;for community mobility  Patient will ascend / descend: stairs;with less pain;with less difficulty;in 6 weeks;Progressing toward  Patient will increase : LEFS score;for improved quality of function;in 6 weeks;Progressing toward  Pt will: perform daily tasks and light housework with increased ease, ankle stability and pain <2/10; in 6 weeks ; Progressing toward    Plan / Patient Education:     Continue with initial plan of care.  Progress with home program as tolerated.     Subjective:     Pain Ratin    Pt reports that his ankle continues to improve each day.  Pt reports that he no longer has much pain, just mostly stiffness.      Pt reports that he has a check-up in about a month.      Pt reports that he felt much better after he left PT with increased ankle mobility.      Pt reports that he did his exercises everyday, 2x per day.      Objective:     Pt tolerates the exercises well with moderate cueing for technique.      Foot/Ankle ROM:                     Date:          17  Ankle AROM ( ) Right Left Right Left Right Left   Dorsiflexion-Gastroc (10 ) 10 12  12   NT         Dorsiflexion-Soleus (20 )                   Plantar Flexion (50 ) 48, p 65   "55   NT         Inversion (45-60 ) 20, p 35  24   NT         Eversion (15-30 ) 12, p 12  18   NT             Pt with right increasing ankle and LE strength and stability.   This continues to be addressed by his HEP.      Pt with decreased to minimal gait deviation.      Current Exercises:  Exercises:  Exercise #1: Bike or Nu-step Warm-up   Comment #1: RL: 7 x 3 min   Exercise #2: Ankle ROM - PF/DF, inversion.eversion, CW, CCW  Comment #2: BAPS Board Level 3 Rx15 each and CW, CCW x10   Exercise #3: Ankle A-Z   Comment #3: Rx2 with 2#  Exercise #4: Ankle bands- PF, DF, inversion, eversion   Comment #4: orange Rx15 each   Exercise #5: Standing Heel/toe raises   Comment #5: reviewed   Exercise #6: tandem Stance   Comment #6: Complex walk - tandem, backwards, side steps, and crossovers 2x20 feet each -cued to work on at home   Exercise #7: Gastroc/soleus stretch   Comment #7: Rx30\" each - added to HEP  Exercise #8: TG Squats   Comment #8: Level 20 Bx15    Pt demonstrates benefits from skilled PT.        Treatment Today     TREATMENT MINUTES COMMENTS   Evaluation     Self-care/ Home management     Manual therapy 15 Right ankle milking, ankle DF and PF mobility; foot mobility   Neuromuscular Re-education     Therapeutic Activity     Therapeutic Exercises 20 See flow sheet    Gait training     Modality__________________                Total 35    Blank areas are intentional and mean the treatment did not include these items.       Benita Holland, PT   3/9/2017  "

## 2021-06-09 NOTE — PROGRESS NOTES
[facility] Daily Progress     Patient Name: Toni Mcclelland  Date: 2017  Visit #: 8  Referring provider: Donny Hoyt DPM  Visit Diagnosis:     ICD-10-CM    1. Acute right ankle pain M25.571    2. Decreased ROM of ankle M25.673    3. Right ankle swelling M25.471    4. Gait, antalgic R26.89    5. Ankle weakness M62.81          Assessment:     HEP/POC compliance is  good .  Patient demonstrates understanding/independence with home program.  Patient is demonstrating readiness for independent sx management and HEP.      Goal Status:  Pt. will demonstrate/verbalize independence in self-management of condition in : 6 weeks;Met  Pt. will be able to walk : on uneven/inclined surfaces;for community mobility;Met  Patient will ascend / descend: stairs;with less pain;with less difficulty;in 6 weeks;Met  Patient will increase : LEFS score;for improved quality of function;in 6 weeks;Met  Pt will: perform daily tasks and light housework with increased ease, ankle stability and pain <2/10; in 6 weeks ; Met    Plan / Patient Education:     Patient to follow-up with the provider and discuss his return to skating/hockey and considering discontinuing PT.  PT will hold the chart for 30 days and then discharge the patient if he does not return.     Subjective:     Pain Ratin    Pt reports that his ankle is feeling great.  He notes that he was on vacation last week and was able to work it in the pool and walk on the beach some and it feels great.        Pt reports that he did his exercises everyday, 2x per day.      Pt reports that he is feeling 80-85% better.      Objective:     Pt has progressed very well with increased ankle mobility, strength, function and decreased pain.      Foot/Ankle ROM:                     Date:          17  Ankle AROM ( ) Right Left Right Left Right Left   Dorsiflexion-Gastroc (10 ) 10 12  12   NT 12 12     Plantar Flexion (50  48, p 65   "55   NT 60    65    Inversion (45-60 ) 20, p 35  24   NT 28   35   Eversion (15-30 ) 12, p 12  18   NT  20    20     Foot/Ankle Strength:                                   Date:         2/13/17 4/4/17  Ankle/Foot MMT (/5) Right Left Right Left   Dorsiflexion 4 5  5  5   Plantar flexion 4- 5  5  5   Inversion 4 5  5  5   Eversion 4 5  5   5    Great Toe Extension             Palpation:  No specific ankle tenderness.      Gait:  Pt has none to very minimal deviation that increases with more complex gait tasks    Balance/Proprioception:  SLS on R for 30 seconds with no LOB  No pain but increased challenge with complex gait/balance tasks     Outcomes:  Lower Extremity Functional Scale (_/80): 67  Eval score was 32/80    Current Exercises:  Exercises:  Exercise #1: Bike or Nu-step Warm-up   Comment #1: treadmill 2.4 mph x3 min   Exercise #2: Ankle ROM - PF/DF, inversion.eversion, CW, CCW  Comment #2: BAPS Board Level 3   Exercise #3: Ankle A-Z   Comment #3: Rx1   Exercise #4: Ankle bands- PF, DF, inversion, eversion   Comment #4: green   Exercise #5: Standing Heel/toe raises   Comment #5: Rx10 with UE assist, Bx20 off step  Exercise #6: line gait: tandem fwd, back tandem, crossover, side shuffle, side push-off, hops, x25 feet each   Comment #6: SLS Rx30\"  Exercise #7: Gastroc/soleus stretch   Comment #7: Sland board Rx30\"  Exercise #8: TG Squats   Comment #8: Level 22 B 2x10 and Level 20 Rx15           Treatment Today     TREATMENT MINUTES COMMENTS   Evaluation     Self-care/ Home management     Manual therapy 10 Right ankle milking, ankle DF and PF mobility; foot mobility   Neuromuscular Re-education     Therapeutic Activity     Therapeutic Exercises 20 See flow sheet    Gait training     Modality__________________                Total 30    Blank areas are intentional and mean the treatment did not include these items.       Benita Holland, PT   4/4/2017  "

## 2021-06-09 NOTE — PROGRESS NOTES
[facility] Daily Progress     Patient Name: Toni Mcclelland  Date: 3/2/2017  Visit #: 5  Referring provider: Donny Hoyt DPM  Visit Diagnosis:     ICD-10-CM    1. Acute right ankle pain M25.571    2. Decreased ROM of ankle M25.673    3. Right ankle swelling M25.471    4. Gait, antalgic R26.89    5. Ankle weakness M62.81          Assessment:     HEP/POC compliance is  good .  Patient demonstrates understanding/independence with home program.  Patient is benefitting from skilled physical therapy and is making steady progress toward functional goals.    Goal Status:  Pt. will demonstrate/verbalize independence in self-management of condition in : 6 weeks;Progressing toward  Pt. will be able to walk : on uneven/inclined surfaces;for community mobility  Patient will ascend / descend: stairs;with less pain;with less difficulty;in 6 weeks;Progressing toward  Patient will increase : LEFS score;for improved quality of function;in 6 weeks;Progressing toward  Pt will: perform daily tasks and light housework with increased ease, ankle stability and pain <2/10; in 6 weeks ; Progressing toward    Plan / Patient Education:     Continue with initial plan of care.  Progress with home program as tolerated.     Subjective:     Pain Ratin    Pt reports that his ankle continues to improve each day.  Pt reports that he still gets sore and stiff after he has increased his activity.      Pt reports that going down stairs is getting much easier.     Pt reports that he felt much better after he left PT with increased ankle mobility.      Pt reports that he did his exercises everyday, 2x per day.      Objective:     Pt tolerates the exercises well with moderate cueing for technique.      Foot/Ankle ROM:                     Date:          17  Ankle AROM ( ) Right Left Right Left Right Left   Dorsiflexion-Gastroc (10 ) 10 12  12   NT         Dorsiflexion-Soleus (20 )                   Plantar  "Flexion (50 ) 48, p 65  55   NT         Inversion (45-60 ) 20, p 35  24   NT         Eversion (15-30 ) 12, p 12  18   NT             Pt with iright increasing ankle and LE strength and stability.   This continues to be addressed by his HEP.      Pt with decreased to minimal gait deviation.      Current Exercises:  Exercises:  Exercise #1: Bike or Nu-step Warm-up   Comment #1: RL: 7 x 3 min   Exercise #2: Ankle ROM - PF/DF, inversion.eversion, CW, CCW  Comment #2: BAPS Board Level 3 Rx15 each way   Exercise #3: Ankle A-Z   Comment #3: Rx1   Exercise #4: Ankle bands- PF, DF, inversion, eversion   Comment #4: orange Rx10 each - added to HEP  Exercise #5: Standing Heel/toe raises   Comment #5: reviewed   Exercise #6: tandem Stance   Comment #6: reviewed   Exercise #7: Gastroc/soleus stretch   Comment #7: Rx30\" each - added to HEP    Pt demonstrates benefits from skilled PT.        Treatment Today     TREATMENT MINUTES COMMENTS   Evaluation     Self-care/ Home management     Manual therapy 15 Right ankle milking, ankle DF and PF mobility; foot mobility   Neuromuscular Re-education     Therapeutic Activity     Therapeutic Exercises 20 See flow sheet    Gait training     Modality__________________                Total 35    Blank areas are intentional and mean the treatment did not include these items.       Benita Holland, PT   3/2/2017  "

## 2021-06-09 NOTE — PROGRESS NOTES
ASSESSMENT:  1. Essential hypertension  - amLODIPine (NORVASC) 5 MG tablet; Take 1 tablet (5 mg total) by mouth daily.  Dispense: 90 tablet; Refill: 0    PLAN:  There are no Patient Instructions on file for this visit.    No orders of the defined types were placed in this encounter.    Medications Discontinued During This Encounter   Medication Reason     amLODIPine (NORVASC) 5 MG tablet Reorder       No Follow-up on file.     Refilled amlodipine for mild hypertension. Patient will let us know if he begins to experience symptoms of hypotension.     CHIEF COMPLAINT:  Chief Complaint   Patient presents with     Follow-up     BP Check- needs refills       HISTORY OF PRESENT ILLNESS:  Toni is a 51 y.o. male presenting to the clinic today for a blood pressure follow-up. He was started on 5 mg amlodipine daily on 12/12/2016 because of elevated blood pressure in clinic. He has probably missed 3 or 4 doses of amlodipine total. His blood pressure today is 138/90. He is not able to check his blood pressure at home, but he may be able to at work. Upon recheck by MD, his blood pressure was 128/84.    S/P Right Ankle Arthroscopy: He had right ankle arthroscopy on 12/15/2016, and he has been doing well since surgery. He has been doing physical therapy over the last 3 weeks or so, which has been helpful. He still has some discomfort in his ankle. The pain is not sharp or shooting. His right ankle is swollen and feels a little tight. The swelling increases throughout the day. The right ankle pain improves incrementally every day since surgery. He has not been playing hockey since the surgery. He has a follow-up with the surgeon in 3 weeks.     REVIEW OF SYSTEMS:   Comprehensive review of systems negative except as noted above.    PFSH:  Reviewed, as below.     TOBACCO USE:  History   Smoking Status     Never Smoker   Smokeless Tobacco     Never Used       VITALS:  Vitals:    03/13/17 1541 03/13/17 1555   BP: 138/90 128/84    Patient Site:  Left Arm   Patient Position:  Sitting   Pulse: 80    Weight: (!) 242 lb (109.8 kg)      Wt Readings from Last 3 Encounters:   03/13/17 (!) 242 lb (109.8 kg)   12/13/16 (!) 235 lb (106.6 kg)   12/12/16 (!) 235 lb 9.6 oz (106.9 kg)     Body mass index is 37.9 kg/(m^2).    PHYSICAL EXAM:  General Appearance: Alert, cooperative, no distress, appears stated age. Blood Pressure Rechecked by MD: 128/84, LUE, sitting.   HEENT: Pupils equal, symmetric  Lungs: Clear to auscultation bilaterally, respirations unlabored  Heart: Regular rate and rhythm, S1 and S2 normal, no murmur, rub, or gallop  Abdomen: Truncal obesity.   Musculoskeletal: Right Lower extremity: Pedal edema that is mostly gravitational.   Neurologic:  Alert and oriented times 3. Normal reflexes. Cranial nerves II-XII intact.   Psychiatric: Normal mood and affect.      ADDITIONAL HISTORY SUMMARIZED (2): None.  DECISION TO OBTAIN EXTRA INFORMATION (1): None.   RADIOLOGY TESTS (1): None.  LABS (1): None.  MEDICINE TESTS (1): None.  INDEPENDENT REVIEW (2 each): None.       The visit lasted a total of 12 minutes face to face with the patient. Over 50% of the time was spent counseling and educating the patient about blood pressure.    Dario SIMMS, am scribing for and in the presence of, Dr. Ríos.    IDr. Ríos, personally performed the services described in this documentation, as scribed by Dario Piedra in my presence, and it is both accurate and complete.    MEDICATIONS:  Current Outpatient Prescriptions   Medication Sig Dispense Refill     amLODIPine (NORVASC) 5 MG tablet Take 1 tablet (5 mg total) by mouth daily. 90 tablet 0     hydrOXYzine (VISTARIL) 25 MG capsule 1-2 po q 4-6 hrs prn 45 capsule 0     No current facility-administered medications for this visit.        Total data points: 1

## 2021-06-09 NOTE — PROGRESS NOTES
[facility] Daily Progress     Patient Name: Toni Mcclelland  Date: 2017  Visit #: 3  Referring provider: Donny Hoyt DPM  Visit Diagnosis:     ICD-10-CM    1. Acute right ankle pain M25.571    2. Decreased ROM of ankle M25.673    3. Right ankle swelling M25.471    4. Gait, antalgic R26.89    5. Ankle weakness M62.81          Assessment:     HEP/POC compliance is  good .  Patient demonstrates understanding/independence with home program.  Patient is benefitting from skilled physical therapy and is making steady progress toward functional goals.    Goal Status:  Pt. will demonstrate/verbalize independence in self-management of condition in : 6 weeks  Pt. will be able to walk : on uneven/inclined surfaces;for community mobility  Patient will ascend / descend: stairs;with less pain;with less difficulty;in 6 weeks  Patient will increase : LEFS score;for improved quality of function;in 6 weeks  Pt will: perform daily tasks and light housework with increased ease, ankle stability and pain <2/10; in 6 weeks     Plan / Patient Education:     Continue with initial plan of care.  Progress with home program as tolerated.     Ankle bands next visit     Take ankle ROM measures next visit    Subjective:     Pain Ratin    Pt reports that his ankle is doing well.  Pt notes that since last visit his ankle has felt really good.  Pt reports this weekend he was walking up and down a hill several times and so the ankle was a little sore.      Pt reports that he felt much better after he left PT with increased ankle mobility.      Pt reports that he did his exercises everyday, 2x per day.      Objective:     Pt tolerates the exercises well with moderate cueing for technique.      Pt with continued loss of right ankle mobility, but PF was slightly improved today.      Pt with decreased right ankle and LE strength and stability.      Pt with min-mod gait deviation.      Current Exercises:  Exercises:  Exercise #1:  "Bike or Nu-step Warm-up   Comment #1: RL: 7 x 5 min   Exercise #2: Ankle ROM - PF/DF, inversion.eversion, CW, CCW  Comment #2: Rx12  each except circles   Exercise #3: Ankle A-Z   Comment #3: Rx1   Exercise #4: Ankle isometrics - PF, DF, inversion, eversion   Comment #4: Rx6 with 5\"   Exercise #5: Seated Heel/toe raises   Comment #5: Rx12    Pt demonstrates benefits from skilled PT.        Treatment Today     TREATMENT MINUTES COMMENTS   Evaluation     Self-care/ Home management     Manual therapy 15 Right ankle milking, ankle DF and PF mobility; foot mobility   Neuromuscular Re-education     Therapeutic Activity     Therapeutic Exercises 15 See flow sheet    Gait training     Modality__________________                Total 30    Blank areas are intentional and mean the treatment did not include these items.       Benita Holland, PT   2/21/2017    "

## 2021-06-09 NOTE — PROGRESS NOTES
[facility] Daily Progress     Patient Name: Toni Mcclelland  Date: 3/16/2017  Visit #: 7  Referring provider: Donny Hoyt DPM  Visit Diagnosis:     ICD-10-CM    1. Acute right ankle pain M25.571    2. Decreased ROM of ankle M25.673    3. Right ankle swelling M25.471    4. Gait, antalgic R26.89    5. Ankle weakness M62.81          Assessment:     HEP/POC compliance is  good .  Patient demonstrates understanding/independence with home program.  Patient is benefitting from skilled physical therapy and is making steady progress toward functional goals.    Goal Status:  Pt. will demonstrate/verbalize independence in self-management of condition in : 6 weeks;Progressing toward  Pt. will be able to walk : on uneven/inclined surfaces;for community mobility  Patient will ascend / descend: stairs;with less pain;with less difficulty;in 6 weeks;Progressing toward  Patient will increase : LEFS score;for improved quality of function;in 6 weeks;Progressing toward  Pt will: perform daily tasks and light housework with increased ease, ankle stability and pain <2/10; in 6 weeks ; Progressing toward    Plan / Patient Education:     Continue with initial plan of care.  Progress with home program as tolerated.     See pt one more visit when he returns from vacation.  Do PN for upcoming MD appt on 17.      Subjective:     Pain Ratin    Pt reports that his ankle continues to improve each day.  Pt reports that he no longer has much pain, just mostly stiffness.      Pt reports that he has a check-up in about a month.      Pt reports that he felt much better after he left PT with increased ankle mobility.      Pt reports that he did his exercises everyday, 2x per day.      Objective:     Pt tolerates the exercises well with moderate cueing for technique.      Foot/Ankle ROM:                     Date:          17  Ankle AROM ( ) Right Left Right Left Right Left   Dorsiflexion-Gastroc (10 )  "10 12  12   NT         Dorsiflexion-Soleus (20 )                   Plantar Flexion (50 ) 48, p 65  55   NT         Inversion (45-60 ) 20, p 35  24   NT         Eversion (15-30 ) 12, p 12  18   NT             Pt with right increasing ankle and LE strength and stability.   This continues to be addressed by his HEP.      Pt with decreased to minimal gait deviation.      Current Exercises:  Exercises:  Exercise #1: Bike or Nu-step Warm-up   Comment #1: treadmill 2.4 mph x3 min   Exercise #2: Ankle ROM - PF/DF, inversion.eversion, CW, CCW  Comment #2: BAPS Board Level 3 Rx15 each and CW, CCW x10   Exercise #3: Ankle A-Z   Comment #3: Rx1   Exercise #4: Ankle bands- PF, DF, inversion, eversion   Comment #4: green Rx15 each   Exercise #5: Standing Heel/toe raises   Comment #5: reviewed   Exercise #6: tandem Stance   Comment #6: Complex walk - tandem, backwards, side steps, and crossovers 2x20 feet each -cued to work on at home   Exercise #7: Gastroc/soleus stretch   Comment #7: Rx30\" each - added to HEP  Exercise #8: TG Squats   Comment #8: Level 20 Bx15; Rx10     Pt demonstrates benefits from skilled PT.        Treatment Today     TREATMENT MINUTES COMMENTS   Evaluation     Self-care/ Home management     Manual therapy 15 Right ankle milking, ankle DF and PF mobility; foot mobility   Neuromuscular Re-education     Therapeutic Activity     Therapeutic Exercises 20 See flow sheet    Gait training     Modality__________________                Total 35    Blank areas are intentional and mean the treatment did not include these items.       Benita Holland, PT   3/16/2017  "

## 2021-06-12 NOTE — PROGRESS NOTES
Assessment/Plan:        Diagnoses and all orders for this visit:    Essential hypertension  -     amLODIPine (NORVASC) 5 MG tablet; Take 1 tablet (5 mg total) by mouth daily.  Dispense: 90 tablet; Refill: 3  -     blood pressure monitor Kit; Use 1 each As Directed daily.  Dispense: 1 each; Refill: 0  I did recheck his blood pressure about 5 minutes of sitting down and it had come down to 134/80 mmHg.  He did lose some weight.  We discussed consistent use of the blood pressure medication and if possible checking his blood pressure daily.  He is hopeful that as he continues to lose weight and become more physically active that he might be able to get to the point that he will stop taking the medications.  I did encourage him to continue with the physical activity and normal limits.  He will come back in 6 months check his labs and possibly do a full physical exam.        Subjective:    Patient ID: Toni Mcclelland is a 52 y.o. male.    HPI Comments: Toni Mcclelland 52-year-old male comes in for recheck of hypertension.  Diagnosed with hypertension in 2016 and has been on amlodipine at 5 mg daily.  He has been doing well on the medication.  Comes in for medication check for refill.  He denied having any side effects or complications from taking amlodipine.  Denied having any swelling to his lower extremities.  He does not really check his blood pressures at home but feels some symptoms if it is notably high.  He was said to have lost some weight and was surprised that his blood pressure was high initially.  Recheck was better.  There is no chest pains or shortness of breath.      The following portions of the patient's history were reviewed and updated as appropriate: allergies, current medications, past family history, past medical history, past social history, past surgical history and problem list.    Review of Systems   Constitutional: Negative for activity change, appetite change and chills.   HENT: Negative.     Respiratory: Negative for chest tightness, wheezing and stridor.    Cardiovascular: Negative for chest pain, palpitations and leg swelling.   Gastrointestinal: Negative.    Musculoskeletal: Negative for back pain and neck pain.   Neurological: Negative for facial asymmetry and headaches.           Vitals:    08/29/17 1540 08/29/17 1608   BP: 162/80 134/80   Pulse: (!) 56    Weight: (!) 229 lb 8 oz (104.1 kg)          Objective:    Physical Exam   Constitutional: He is oriented to person, place, and time. He appears well-developed and well-nourished. No distress.   HENT:   Mouth/Throat: Oropharynx is clear and moist.   Neck: Normal range of motion. Neck supple.   Cardiovascular: Normal rate and regular rhythm.    Pulmonary/Chest: Effort normal and breath sounds normal.   Abdominal: Soft. There is no tenderness.   Neurological: He is alert and oriented to person, place, and time.

## 2021-06-13 NOTE — PATIENT INSTRUCTIONS - HE
Dear Toni Mcclelland,    Your symptoms show that you may have coronavirus (COVID-19). This illness can cause fever, cough and trouble breathing. Many people get a mild case and get better on their own. Some people can get very sick.    Will I be tested for COVID-19?  We would like to test you for Covid-19 virus. I have placed orders for this test.     For all employees or close contacts (except [employer] and [employer] - see below), go to your [employer] home page and scroll down to the section that says  You have an appointment that needs to be scheduled  and click the large green button that says  Schedule Now  and follow the steps to find the next available opening.     If you are unable to complete these steps or if you cannot find any available times, please call [phone] to schedule employee testing.       [facility] employees or close contacts, please call [phone].   [place] [employer]) employees or close contacts call [phone].    When it's time for your COVID test:  Stay at least 6 feet away from others. (If someone will drive you to your test, stay in the backseat, as far away from the  as you can.)  Cover your mouth and nose with a mask, tissue or washcloth.  Go straight to the testing site. Don't make any stops on the way there or back.    Starting now:     Do not go to work.  o If you receive a negative COVID-19 test result and were NOT exposed to someone with a known positive COVID-19 test, you can return to work once you're free of fever for 24 hours without fever-reducing medication and your symptoms are improving or resolved.  o If you receive a positive COVID-19 test result, you must be cleared by Employee Occupational Health and Safety to return to work.   o If you were exposed to someone who has tested positive for COVID-19, you can return to work 14 days after your last contact with the positive individual, provided you do not have symptoms at all during that time. In some cases,  "your manager may ask you to come back sooner than 14 days.     During this time, don't leave the house except for testing or medical care.  o Stay in your own room, even for meals. Use your own bathroom if you can.  o Stay away from others in your home. No hugging, kissing or shaking hands. No visitors.  o Don't go to work, school or anywhere else.    Clean \"high touch\" surfaces often (doorknobs, counters, handles, etc.). Use a household cleaning spray or wipes. You'll find a full list of  on the EPA website: www.epa.gov/pesticide-registration/list-n-disinfectants-use-against-sars-cov-2.    Cover your mouth and nose with a mask, tissue or washcloth to avoid spreading germs.    Wash your hands and face often. Use soap and water.    People in these groups are at risk for severe illness due to COVID-19:  o People 65 years and older  o People who live in a nursing home or long-term care facility  o People with chronic disease (lung, heart, cancer, diabetes, kidney, liver, immunologic)  o People who have a weakened immune system, including those who:  - Are in cancer treatment  - Take medicine that weakens the immune system, such as corticosteroids  - Had a bone marrow or organ transplant  - Have an immune deficiency  - Have poorly controlled HIV or AIDS  - Are obese (body mass index of 40 or higher)  - Smoke regularly      Caregivers should wear gloves while washing dishes, handling laundry and cleaning bedrooms and bathrooms.    Use caution when washing and drying laundry: Don't shake dirty laundry, and use the warmest water setting that you can.    For more tips, go to www.cdc.gov/coronavirus/2019-ncov/downloads/10Things.pdf.    Sign up for [employer]. We know it's scary to hear that you might have COVID-19. We want to track your symptoms to make sure you're okay over the next 2 weeks. Please look for an email from [employer]--this is a free, online program that we'll use to keep in touch. To sign up, " follow the link in the email you will receive. Learn more at [email or web address]    How can I take care of myself?    Get lots of rest. Drink extra fluids (unless a doctor has told you not to)    Take Tylenol (acetaminophen) for fever or pain. If you have liver or kidney problems, ask your family doctor if it's okay to take Tylenol.  Adults can take either:    650 mg (two 325 mg pills) every 4 to 6 hours, or     1,000 mg (two 500 mg pills) every 8 hours as needed.    Note: Don't take more than 3,000 mg in one day. Acetaminophen is found in many medicines (both prescribed and over-the-counter medicines). Read all labels to be sure you don't take too much.  For children, check the Tylenol bottle for the right dose. The dose is based on the child's age or weight.    If you have other health problems (like cancer, heart failure, an organ transplant or severe kidney disease): Call your specialty clinic if you don't feel better in the next 2 days.    Know when to call 911. Emergency warning signs include:  Trouble breathing or shortness of breath  Pain or pressure in the chest that doesn't go away  Feeling confused like you haven't felt before, or not being able to wake up  Bluish-colored lips or face    Where can I get more information?    [facility] - About COVID-19: [email or web address]  CDC - What to Do If You're Sick: www.cdc.gov/coronavirus/2019-ncov/about/steps-when-sick.html      November 18, 2020    RE:  Toni Mcclelland                                                                                                                                                       [address] [place] [address]        To whom it may concern:    I evaluated Toni Mcclelland on 11/18/20. Toni Mcclelland should be excused from work/school.     Do not go to work.      If you receive a negative COVID-19 test result and were NOT exposed to someone with a known positive COVID-19 test, you can return  to work once you're free of fever for 24 hours without fever-reducing medication and your symptoms are improving or resolved.    If you receive a positive COVID-19 test result, you must be cleared by Employee Occupational Health and Safety to return to work.     If you were exposed to someone who has tested positive for COVID-19, you can return to work 14 days after your last contact with the positive individual, provided you do not have symptoms at all during that time. In some cases, your manager may ask you to come back sooner than 14 days.       Sincerely,  Trinity Lira PA-C, PAThanhC

## 2021-06-13 NOTE — PROGRESS NOTES
Chief Complaint   Patient presents with     Laceration     L/t, middle finger x today       HPI:    Patient is here for laceration to left middle finger, occurred shortly before coming to clinic. He accidentally cut his finger with an equipment knife. Bleeding still persists. Moderate pain. He said he is up to date with tetanus. No fever, numbness of affected finger.    ROS: Pertinent ROS noted in HPI.     No Known Allergies    Patient Active Problem List   Diagnosis     Diverticulosis of colon without diverticulitis       Family History   Problem Relation Age of Onset     Heart disease Mother      Memory loss Mother      Heart disease Father      No Medical Problems Sister      No Medical Problems Son      Colon cancer Maternal Grandmother      Colon cancer Paternal Grandfather        Social History     Social History     Marital status:      Spouse name: N/A     Number of children: N/A     Years of education: N/A     Occupational History     Not on file.     Social History Main Topics     Smoking status: Never Smoker     Smokeless tobacco: Never Used     Alcohol use 0.6 - 1.2 oz/week     1 - 2 Cans of beer per week     Drug use: No     Sexual activity: Yes     Partners: Female     Other Topics Concern     Not on file     Social History Narrative       Objective:    Vitals:    09/14/17 1933   BP: 110/90   Pulse: 64   Temp: 98.8  F (37.1  C)   SpO2: 97%       Gen:NAD  MSK: 1 cm avulsion laceration at tip of left middle distal phalanx with active bleeding. Full ROM and strength of all joints of the affected finger.   Neuro: intact gross sensation of the affected finger.       Assessment:    Left middle finger laceration    Plan:    Gelfoam was cut to the approximate size of the laceration and placed over the laceration (three layers). Bleeding stopped. Pressure dressing applied.   Discussed home cares, and follow up plan.

## 2021-06-15 NOTE — PROGRESS NOTES
ASSESSMENT:  1. Acute pain of right shoulder  - Ambulatory referral to Orthopedics    2. Right wrist pain  - Ambulatory referral to Orthopedics      PLAN / MDM:  I discussed the possibilities for him.  At this point I do not think that we will need to do any x-rays or shots into the shoulder or wrist.  Some going to go ahead and have him referred to orthopedic doctor for evaluation and management.    Orders Placed This Encounter   Procedures     Ambulatory referral to Orthopedics     Referral Priority:   Routine     Referral Type:   Consultation     Referral Reason:   Evaluation and Treatment     Referral Location:   [facility]     Requested Specialty:   Orthopedics     Number of Visits Requested:   1     There are no discontinued medications.    No follow-ups on file.      CHIEF COMPLAINT:  Chief Complaint   Patient presents with     Shoulder Pain     right pain- had cortisone injection in May 20 [facility]     Wrist Pain     on the right pain when he uses this mouse for the computer       HISTORY OF PRESENT ILLNESS:  Toni is a 55 y.o. male presenting to the clinic today with concerns of having had right shoulder pain which he noted started suddenly.  First time he had it was about 6-7 months ago and was given a steroid shot at an outside clinic.  He noted there was an improvement following that.  But recently he has been playing outside with his dog and trying to throw balls for the dog, he started having some pain afterwards.  He noted pain that is worse with lifting his hand and with movements.  He cannot remember having had any other injuries.  Date practice with his son sometime in the past for baseball but not anymore.  He also notices that he is having wrist pain on the right side as well, he uses his computer very much and thinks that it is a result of that.  He thinks he might have some carpal tunnel problems.  But he noted no numbness or tingling in the fingers.  He has been taking some  ibuprofen and that has been helpful but he does not want to continue to take ibuprofen because of its effect.    REVIEW OF SYSTEMS:   Review of system was done as in the history.  All other systems are negative.    PFSH:  Reviewed, as below.    Social History     Tobacco Use   Smoking Status Never Smoker   Smokeless Tobacco Never Used       Family History   Problem Relation Age of Onset     Heart disease Mother      Memory loss Mother      Heart disease Father      No Medical Problems Sister      No Medical Problems Son      Colon cancer Maternal Grandmother      Colon cancer Paternal Grandfather        Social History     Socioeconomic History     Marital status:      Spouse name: Not on file     Number of children: Not on file     Years of education: Not on file     Highest education level: Not on file   Occupational History     Not on file   Social Needs     Financial resource strain: Not on file     Food insecurity     Worry: Not on file     Inability: Not on file     Transportation needs     Medical: Not on file     Non-medical: Not on file   Tobacco Use     Smoking status: Never Smoker     Smokeless tobacco: Never Used   Substance and Sexual Activity     Alcohol use: Yes     Alcohol/week: 1.0 - 2.0 standard drinks     Types: 1 - 2 Cans of beer per week     Drug use: No     Sexual activity: Yes     Partners: Female   Lifestyle     Physical activity     Days per week: Not on file     Minutes per session: Not on file     Stress: Not on file   Relationships     Social connections     Talks on phone: Not on file     Gets together: Not on file     Attends Jew service: Not on file     Active member of club or organization: Not on file     Attends meetings of clubs or organizations: Not on file     Relationship status: Not on file     Intimate partner violence     Fear of current or ex partner: Not on file     Emotionally abused: Not on file     Physically abused: Not on file     Forced sexual activity:  "Not on file   Other Topics Concern     Not on file   Social History Narrative     Not on file       Past Surgical History:   Procedure Laterality Date     ANKLE ARTHROSCOPY Right 12/15/2016    Procedure: ANKLE ARTHROSCOPY;  Surgeon: Donny Hoyt DPM;  Location: [facility] OR;  Service:      ANKLE ARTHROTOMY Right 12/15/2016    Procedure: OPEN ARTHROTOMY, LATERAL ANKLE STABLIZATION WITH TENDON GRAFT, RIGHT ANKLE;  Surgeon: Donny Hoyt DPM;  Location: [facility] OR;  Service:      APPENDECTOMY       HERNIA REPAIR       OTHER SURGICAL HISTORY      telescopic bowel age 5       No Known Allergies    Active Ambulatory Problems     Diagnosis Date Noted     Diverticulosis of colon without diverticulitis 07/05/2016     Hyperlipidemia 02/10/2020     Bronchitis 02/10/2020     Obesity (BMI 35.0-39.9) with comorbidity (H) 12/07/2020     Essential hypertension 05/22/2020     Resolved Ambulatory Problems     Diagnosis Date Noted     No Resolved Ambulatory Problems     Past Medical History:   Diagnosis Date     Hypertension        Current Outpatient Medications   Medication Sig Dispense Refill     amLODIPine (NORVASC) 10 MG tablet Take 1 tablet (10 mg total) by mouth daily. 90 tablet 4     No current facility-administered medications for this visit.        VITALS:  Vitals:    02/08/21 1430   BP: 136/90   Patient Site: Left Arm   Patient Position: Sitting   Cuff Size: Adult Large   Pulse: 74   Temp: 98.1  F (36.7  C)   TempSrc: Oral   Weight: (!) 237 lb 9.6 oz (107.8 kg)   Height: 5' 7.75\" (1.721 m)     Wt Readings from Last 3 Encounters:   02/08/21 (!) 237 lb 9.6 oz (107.8 kg)   12/07/20 (!) 231 lb 1.6 oz (104.8 kg)   02/10/20 (!) 235 lb (106.6 kg)     Body mass index is 36.39 kg/m .    PHYSICAL EXAM:  General Appearance: Alert, cooperative, no distress, appears stated age  HEENT: Pupils are equal and reactive, extraocular motions is normal. Neck is supple no notable thyromegaly.  External ears are normal.  Lungs: " Clear to auscultation bilaterally, respirations unlabored  Heart: Regular rhythm and normal rate,S1 and S2 normal.  Musculoskeletal: He does have reduced range of motion on the right shoulder with tenderness noted in the anterior aspect above the insertion of the medial tendon of the biceps.  He does have increasing pain with external rotation of the arm.  He also has mild discomfort with flexion of the arm as well.  He has some tenderness around the wrist, but the nurse test was negative.  No tenderness noted in the cervical region of the neck and he has a negative axial loading to the neck.  Neurologic:  Alert and oriented times 3. Normal reflexes. Cranial nerves II-XII intact.   Psychiatric: Normal mood and affect.    MEDICATIONS:  Current Outpatient Medications   Medication Sig Dispense Refill     amLODIPine (NORVASC) 10 MG tablet Take 1 tablet (10 mg total) by mouth daily. 90 tablet 4     No current facility-administered medications for this visit.

## 2021-06-16 PROBLEM — I10 ESSENTIAL HYPERTENSION: Status: ACTIVE | Noted: 2020-05-22

## 2021-06-16 PROBLEM — J40 BRONCHITIS: Status: ACTIVE | Noted: 2020-02-10

## 2021-06-16 PROBLEM — E66.01 MORBID OBESITY (H): Status: ACTIVE | Noted: 2020-12-07

## 2021-06-16 PROBLEM — E78.49 OTHER HYPERLIPIDEMIA: Status: ACTIVE | Noted: 2020-02-10

## 2021-06-17 NOTE — PATIENT INSTRUCTIONS - HE
"Patient Instructions by Dahiana Scott MD at 10/21/2019 11:20 AM     Author: Dahiana Scott MD Service: -- Author Type: Physician    Filed: 10/21/2019 11:34 AM Encounter Date: 10/21/2019 Status: Signed    : Dahiana Scott MD (Physician)         Patient Education     Tick Bites  Ticks are small arachnids that feed on the blood of rodents, rabbits, birds, deer, dogs, and people. A tick bite may cause redness, itching, and slight swelling at the site. Sometimes you may have no reaction where the tick bit you.  Ticks transmit disease when microbes in their saliva get into your skin and blood. There are over 800 species of ticks. But only two families of ticks, hard ticks and soft ticks, are known to transmit diseases to humans. Ticks often transmit a disease near the end of a meal. The hard ticks tend to attach and feed for hours to days. It may take hours before a hard tick transmits microbes. Soft ticks often feed for less than 1 hour and can transmit diseases quickly. The bites themselves aren't cause for concern. But ticks can carry and pass on 12 different illnesses. These include Lyme disease and Rei Mountain spotted fever.  Symptoms of tick-related diseases vary depending on the disease. The most common symptoms are:    Fever    Chills    Aches and pains such as headache, extreme tiredness (fatigue), and muscle aches    Joint pain (with Lyme disease)    Rash     A \"bull's eye\" rash is a common symptom of Lyme disease.   How to remove a tick  Not all ticks carry disease. A tick attached to you anywhere from minutes to days may infect you, depending on the type of tick and the germs it carries. If you find a tick, don't panic.     Try to carefully remove the tick with tweezers.    Grasp the tick near its head as close to the skins surface as possible. Pull without twisting and don't crush the body.    After removing the tick, thoroughly clean the bite area and your hands with rubbing alcohol or soap and " water.    Put a live tick in alcohol, or in a sealed bag or container, or flush it down the toilet.  When to get medical care   If you can't easily remove the tick or if you leave the head in your skin, get medical care right away.   Tick paralysis is a rare disease thought to be caused by a toxin in tick saliva. The symptoms include:    Weakness    Paralysis    Confusion    Fever    Numbness    Headaches    Rashes  If you or someone bitten by a tick has these symptoms, get medical care right away. Removing the tick stops the symptoms in about 24 hours.   If you have a rash or fever within a few weeks of removing a tick, see your healthcare provider. Tell the provider about your recent tick bite, when the bite occurred, and where you most likely acquired the tick.    To prevent disease, you may be given antibiotics. Both Lyme disease and Rei Mountain spotted fever respond quickly to these medicines.    You may be asked to see your healthcare provider for a blood test. This is to check for Lyme or another tick-related disease.  Follow-up care  Some [facility] and [facility] have services that test ticks for Lyme disease and other diseases. Check with your local officials to see if this service is available in your area.  If you remove a tick yourself, watch for signs of a tick-borne illness. Symptoms may show up in a few days or weeks after a bite. Call your healthcare provider if you notice any of the following:    Rash. This may spread outward in a ring from a hard, white lump. Or it may move up your arms and legs to your chest.    Fever    Chills    Body aches, joint swelling, and pain    Severe headache  Date Last Reviewed: 8/1/2019 2000-2019 [employer]. [address] [place] [address], [place] [address]. All rights reserved. This information is not intended as a substitute for professional medical care. Always follow your healthcare professional's instructions.

## 2021-06-21 NOTE — PROGRESS NOTES
ASSESSMENT/PLAN:  HTN (hypertension)  Good control and amlodipine 5 mg.  I recommend low-sodium diet and healthy lifestyle modification.  He will come back for fasting lab.  Follow-up with primary care provider every 6 months.  Reviewed side effects of amlodipine.  -     Lipid Cascade; Future  -     Comprehensive Metabolic Panel; Future    Screening for prostate cancer  -     PSA (Prostatic-Specific Antigen), Annual Screen; Future    SUBJECTIVE:    Toni Mcclelland is a 53 y.o. male who came in today for medication check.  He takes Lodine 5 mg.  Has been on medication for the last 2 years now.  Denies any leg swelling.  He is quite active.  He plays hockey frequently but has taken a break because of building his new house.  No cardiopulmonary symptoms.  Coronary artery disease in the family.  Prostate cancer in the family as well.  Colon cancer in the family as well.    Patient had his colonoscopy done a couple years ago.  He does not have any gastrointestinal symptoms.    Review of Systems (except those mentioned above)  Constitutional: Negative.   HENT: Negative.   Eyes: Negative.   Respiratory: Negative.   Cardiovascular: Negative.   Gastrointestinal: Negative.   Endocrine: Negative.   Genitourinary: Negative.   Musculoskeletal: Negative.   Skin: Negative.   Allergic/Immunologic: Negative.   Neurological: Negative.   Hematological: Negative.   Psychiatric/Behavioral: Negative.     Patient Active Problem List    Diagnosis Date Noted     Diverticulosis of colon without diverticulitis 07/05/2016     No Known Allergies  Current Outpatient Prescriptions   Medication Sig Dispense Refill     amLODIPine (NORVASC) 5 MG tablet Take 1 tablet (5 mg total) by mouth daily. Needs to be seen for more refills. 90 tablet 0     blood pressure monitor Kit Use 1 each As Directed daily. 1 each 0     No current facility-administered medications for this visit.      Past Medical History:   Diagnosis Date     Hypertension      Past  Surgical History:   Procedure Laterality Date     ANKLE ARTHROSCOPY Right 12/15/2016    Procedure: ANKLE ARTHROSCOPY;  Surgeon: Donny Hoyt DPM;  Location: [facility] OR;  Service:      ANKLE ARTHROTOMY Right 12/15/2016    Procedure: OPEN ARTHROTOMY, LATERAL ANKLE STABLIZATION WITH TENDON GRAFT, RIGHT ANKLE;  Surgeon: Donny Hoyt DPM;  Location: [facility] OR;  Service:      APPENDECTOMY       HERNIA REPAIR       OTHER SURGICAL HISTORY      telescopic bowel age 5     Social History     Social History     Marital status:      Spouse name: N/A     Number of children: N/A     Years of education: N/A     Social History Main Topics     Smoking status: Never Smoker     Smokeless tobacco: Never Used     Alcohol use 0.6 - 1.2 oz/week     1 - 2 Cans of beer per week     Drug use: No     Sexual activity: Yes     Partners: Female     Other Topics Concern     None     Social History Narrative     Family History   Problem Relation Age of Onset     Heart disease Mother      Memory loss Mother      Heart disease Father      No Medical Problems Sister      No Medical Problems Son      Colon cancer Maternal Grandmother      Colon cancer Paternal Grandfather          OBJECTIVE:    Vitals:    10/25/18 1452   BP: 136/88   Patient Site: Left Arm   Patient Position: Sitting   Cuff Size: Adult Large   Pulse: 72   Weight: (!) 231 lb (104.8 kg)     Body mass index is 36.18 kg/(m^2).    Physical Exam:  Constitutional: Patient was oriented to person, place, and time. Patient appeared well-developed and well-nourished. No distress.   Head: Normocephalic and atraumatic.   Right Ear: External ear normal.   Left Ear: External ear normal.   Nose: Nose normal.   Eyes: Conjunctivae and EOM were normal. Right eye exhibited no discharge. Left eye exhibited no discharge. No scleral icterus.   Neck: Neck supple. No JVD present. No tracheal deviation present. No thyromegaly present.   Lymphadenopathy:  Patient has no cervical  adenopathy.   Cardiovascular: Normal rate, regular rhythm, normal heart sounds and intact distal pulses. No murmur heard.   Pulmonary/Chest: Effort normal and breath sounds normal. No stridor. No respiratory distress. Patient had no wheezes, no rales, exhibits no tenderness.   Skin: Skin was warm and dry. No rash noted. Patient was not diaphoretic. No erythema. No pallor.       Results for orders placed or performed in visit on 12/12/16   Hemoglobin   Result Value Ref Range    Hemoglobin 18.2 (H) 14.0 - 18.0 g/dL   Electrocardiogram Perform and Read   Result Value Ref Range    SYSTOLIC BLOOD PRESSURE  mmHg    DIASTOLIC BLOOD PRESSURE  mmHg    VENTRICULAR RATE 51 BPM    ATRIAL RATE 51 BPM    P-R INTERVAL 168 ms    QRS DURATION 78 ms    Q-T INTERVAL 414 ms    QTC CALCULATION (BEZET) 381 ms    P Axis 63 degrees    R AXIS 9 degrees    T AXIS 13 degrees    MUSE DIAGNOSIS       Sinus bradycardia  Otherwise normal ECG  When compared with ECG of 15-MAR-2016 15:23,  No significant change was found  Confirmed by DEN JIMENEZ MD LOC:[facility] ([id number]) on 12/12/2016 2:24:54 PM

## 2021-06-26 ENCOUNTER — HEALTH MAINTENANCE LETTER (OUTPATIENT)
Age: 56
End: 2021-06-26

## 2021-06-28 NOTE — PROGRESS NOTES
Progress Notes by Dahiana Scott MD at 10/21/2019 11:20 AM     Author: Dahiana Scott MD Service: -- Author Type: Physician    Filed: 10/21/2019 11:52 AM Encounter Date: 10/21/2019 Status: Signed    : Dahiana Scott MD (Physician)       Assessment/plan   Toni Mcclelland is a 54 y.o. male who is New  patient to my practice here with   Chief Complaint   Patient presents with   ? Insect Bite     deer tick bite, redness at sight, found under left shoulder blade on ashley 10/20/19        Toni was seen today for insect bite.    Diagnoses and all orders for this visit:    Tick bite, initial encounter  -     Borrelia Species by PCR (Lyme Disease)  -     doxycycline (MONODOX) 100 MG capsule; 2 tab to be taken X1    Screening for metabolic disorder  -     Glycosylated Hemoglobin A1c  -     Lipid Cascade    Screening for prostate cancer  -     PSA (Prostatic-Specific Antigen), Annual Screen    Essential hypertension  -     Basic Metabolic Panel      As patient take is attached to the body less than 36-hour with positive dear take on exam we will presumptively treat with doxycycline 200 mg one-time dose but also running a titers if it is negative.  Advised to follow-up in 6 weeks if any progression of the rash or any new symptoms to redo the titers.  Patient did mention he had an extreme nausea vomiting after the doxycycline last time he had a tick bite advised to take it with the food  Labs will be available for his PCP to discuss during his physical      Subjective:      HPI: oTni Mcclelland is a 54 y.o. male is here for evaluation and treatment for tick bite .  [place] he was quite and wilderness over the weekend when he came home late night on Sunday was taking a shower and he found that take attached to his body able to remove it and put it in the Ziploc for evaluation in the clinic today.  It is definitely looks like a deer tick.  With bite site erythema but no  erythema migrans yet.  Most likely tick attached to his body not more than 36-hour.  No flulike symptoms no other body aches or joint pain.    Blood pressure patient take amlodipine every night blood pressure was quite elevated on initial check but recheck was within normal limit.  Patient is coming for fasting labs and physical tomorrow we will run those labs today because he is also fasting today along with his Lyme titers          I have personally reviewed the patient's allergies, medications, past medical history, family history, social history, rooming notes and problem list in detail and updated the patient record as necessary.      Past Medical History:   Diagnosis Date   ? Hypertension      Past Surgical History:   Procedure Laterality Date   ? ANKLE ARTHROSCOPY Right 12/15/2016    Procedure: ANKLE ARTHROSCOPY;  Surgeon: Donny Hoyt DPM;  Location: [facility] OR;  Service:    ? ANKLE ARTHROTOMY Right 12/15/2016    Procedure: OPEN ARTHROTOMY, LATERAL ANKLE STABLIZATION WITH TENDON GRAFT, RIGHT ANKLE;  Surgeon: Donny Hoyt DPM;  Location: [facility] OR;  Service:    ? APPENDECTOMY     ? HERNIA REPAIR     ? OTHER SURGICAL HISTORY      telescopic bowel age 5     Patient has no known allergies.  Current Outpatient Medications   Medication Sig Dispense Refill   ? amLODIPine (NORVASC) 5 MG tablet TAKE 1 TABLET BY MOUTH ONCE DAILY . APPOINTMENT REQUIRED FOR FUTURE REFILLS 30 tablet 1   ? blood pressure monitor Kit Use 1 each As Directed daily. 1 each 0   ? doxycycline (MONODOX) 100 MG capsule 2 tab to be taken X1 20 capsule 0     No current facility-administered medications for this visit.      Family History   Problem Relation Age of Onset   ? Heart disease Mother    ? Memory loss Mother    ? Heart disease Father    ? No Medical Problems Sister    ? No Medical Problems Son    ? Colon cancer Maternal Grandmother    ? Colon cancer Paternal Grandfather        Patient Active Problem List   Diagnosis   ?  "Diverticulosis of colon without diverticulitis       Review of Systems   12 point comprehensive review of systems was negative except as noted and HPI     Social History     Patient does not qualify to have social determinant information on file (likely too young).   Social History Narrative   ? Not on file       Objective:     Vitals:    10/21/19 1122 10/21/19 1145   BP: (!) 150/94 128/84   Pulse: 68 68   Weight: (!) 226 lb 6.4 oz (102.7 kg)    Height: 5' 7.48\" (1.714 m)        Physical Exam:   Physical Exam:  General Appearance:  Appears comfortable, Alert, cooperative, no distress,   Chest Wall: + tick bite erythema close to right arm pit on the mid scapular region   Heart: Regular rate and rhythm, S1 and S2 normal, no murmur, rubs or gallop  Abdomen: Soft, non-tender, bowel sounds active all four quadrants,   no masses, no organomegaly  Extremities: Extremities normal, atraumatic, no cyanosis or edema  Pulses: DP pulses are 1-2+ bilat.    Neurologic: normal and equal strength bilat in upper and lower extremities        This note has been dictated using voice recognition software. Any grammatical or context distortions are unintentional and inherent to the software  Dahiana Scott MD    Patient Instructions       Patient Education     Tick Bites  Ticks are small arachnids that feed on the blood of rodents, rabbits, birds, deer, dogs, and people. A tick bite may cause redness, itching, and slight swelling at the site. Sometimes you may have no reaction where the tick bit you.  Ticks transmit disease when microbes in their saliva get into your skin and blood. There are over 800 species of ticks. But only two families of ticks, hard ticks and soft ticks, are known to transmit diseases to humans. Ticks often transmit a disease near the end of a meal. The hard ticks tend to attach and feed for hours to days. It may take hours before a hard tick transmits microbes. Soft ticks often feed for less than 1 hour and can " "transmit diseases quickly. The bites themselves aren't cause for concern. But ticks can carry and pass on 12 different illnesses. These include Lyme disease and Rei Mountain spotted fever.  Symptoms of tick-related diseases vary depending on the disease. The most common symptoms are:    Fever    Chills    Aches and pains such as headache, extreme tiredness (fatigue), and muscle aches    Joint pain (with Lyme disease)    Rash     A \"bull's eye\" rash is a common symptom of Lyme disease.   How to remove a tick  Not all ticks carry disease. A tick attached to you anywhere from minutes to days may infect you, depending on the type of tick and the germs it carries. If you find a tick, don't panic.     Try to carefully remove the tick with tweezers.    Grasp the tick near its head as close to the skins surface as possible. Pull without twisting and don't crush the body.    After removing the tick, thoroughly clean the bite area and your hands with rubbing alcohol or soap and water.    Put a live tick in alcohol, or in a sealed bag or container, or flush it down the toilet.  When to get medical care   If you can't easily remove the tick or if you leave the head in your skin, get medical care right away.   Tick paralysis is a rare disease thought to be caused by a toxin in tick saliva. The symptoms include:    Weakness    Paralysis    Confusion    Fever    Numbness    Headaches    Rashes  If you or someone bitten by a tick has these symptoms, get medical care right away. Removing the tick stops the symptoms in about 24 hours.   If you have a rash or fever within a few weeks of removing a tick, see your healthcare provider. Tell the provider about your recent tick bite, when the bite occurred, and where you most likely acquired the tick.    To prevent disease, you may be given antibiotics. Both Lyme disease and Rei Mountain spotted fever respond quickly to these medicines.    You may be asked to see your healthcare " provider for a blood test. This is to check for Lyme or another tick-related disease.  Follow-up care  Some states and counties have services that test ticks for Lyme disease and other diseases. Check with your local officials to see if this service is available in your area.  If you remove a tick yourself, watch for signs of a tick-borne illness. Symptoms may show up in a few days or weeks after a bite. Call your healthcare provider if you notice any of the following:    Rash. This may spread outward in a ring from a hard, white lump. Or it may move up your arms and legs to your chest.    Fever    Chills    Body aches, joint swelling, and pain    Severe headache  Date Last Reviewed: 8/1/2019 2000-2019 [employer]. [address] [place] [address], [place] [address]. All rights reserved. This information is not intended as a substitute for professional medical care. Always follow your healthcare professional's instructions.

## 2021-06-30 NOTE — PROGRESS NOTES
Progress Notes by Dahiana Scott MD at 12/7/2020  8:00 AM     Author: Dahiana Scott MD Service: -- Author Type: Physician    Filed: 12/7/2020 12:55 PM Encounter Date: 12/7/2020 Status: Signed    : Dahiana Scott MD (Physician)       MALE PREVENTATIVE EXAM    Assessment and Plan:   Patient has been advised of split billing requirements and indicates understanding: Yes    Toni was seen today for annual exam.    Routine general medical examination at a health care facility    I discussed the following with the patient:   Adult Healthy Living: Importance of regular exercise  Healthy nutrition  Weight loss referral options  Getting adequate sleep  Stress management  Sporting equipment safety  Supplement use  Herbal medications/alternative medical therapies    Morbid obesity (H)  Working physically be more active used to play ice hockey 3-4 times a week but since ice rings are closed trying to walk daily  Wt Readings from Last 3 Encounters:   12/07/20 (!) 231 lb 1.6 oz (104.8 kg)   02/10/20 (!) 235 lb (106.6 kg)   10/22/19 (!) 226 lb (102.5 kg)     Essential hypertension  Patient blood pressure running in to upper high normal will recommend to increase her dose to 10 mg today he can start taking 1-1/2 tablet of his current prescription of 5 mg if any side effect he will let me know.  Patient overall 10-year cardiac risk is 9.1% which is very important to optimize blood pressure control and cholesterol level.  -     Basic Metabolic Panel  -     amLODIPine (NORVASC) 10 MG tablet; Take 1 tablet (10 mg total) by mouth daily.    Hyperlipidemia  Follow-up on the lipid panel  Screening for metabolic disorder  -     Lipid Cascade    Special screening for malignant neoplasms, colon  -History of colon polyps it was recommended to repeat the colonoscopy every 5 years       Ambulatory referral for Colonoscopy    Screening for prostate cancer  No new concerns we will repeat the PSA screening  -     PSA, Annual Screen  (Prostatic-Specific Antigen)    Other orders  -     Varicella Zoster, Recombinant Vaccine IM        Next follow up:  Yearly       Immunization Review    Immunization History   Administered Date(s) Administered   ? INFLUENZA,SEASONAL QUAD, PF, =/> 6months 09/21/2017   ? Influenza, inj, historic,unspecified 11/01/2013, 10/16/2019   ? MMR 06/03/2015   ? Td, Adult, Absorbed 03/19/1999   ? Td, adult adsorbed, PF 10/22/2019   ? Tdap 07/31/2009   ? Varicella 06/03/2015   ? ZOSTER, RECOMBINANT, IM 12/07/2020       I have had an Advance Directives discussion with the patient.  The following high BMI interventions were performed this visit: dietary management education, guidance, and counseling   I have had an Advance Directives discussion with the patient.    Subjective:   Chief Complaint: Toni Mcclelland is an 55 y.o. male here for a preventative health visit.     HPI:  Hypertension: Patient here for follow-up of elevated blood pressure. He is exercising and is adherent to low salt diet.  Blood pressure not sure if  well controlled at home. Cardiac symptoms none. Patient denies chest pain, chest pressure/discomfort, claudication, dyspnea, exertional chest pressure/discomfort, fatigue, irregular heart beat, lower extremity edema, near-syncope, orthopnea, palpitations, paroxysmal nocturnal dyspnea, syncope and tachypnea.  Cardiovascular risk factors: advanced age (older than 55 for men, 65 for women), hypertension, male gender and obesity (BMI >= 30 kg/m2). Use of agents associated with hypertension: none. History of target organ damage: none.    Healthy Habits  Are you taking a daily aspirin? No  Do you typically exercising at least 40 min, 3-4 times per week?  Yes  Do you usually eat at least 4 servings of fruit and vegetables a day, include whole grains and fiber and avoid regularly eating high fat foods? NO  Have you had an eye exam in the past two years? Yes  Do you see a dentist twice per year? NO  Do you have any  "concerns regarding sleep? No    Safety Screen  If you own firearms, are they secured in a locked gun cabinet or with trigger locks? The patient declines to answer  Do you feel you are safe where you are living?: Yes (2/10/2020 10:13 AM)  Do you feel you are safe in your relationship(s)?: Yes (2/10/2020 10:13 AM)      Review of Systems:  Please see above.  The rest of the review of systems are negative for all systems.     Cancer Screening     Patient has no health maintenance due at this time          Patient Care Team:  Raheem Ríos MD as PCP - General (Family Medicine)  Edil Mayes DO as Assigned PCP        History     Reviewed By Date/Time Sections Reviewed    Eda Cano CMA 12/7/2020  8:07 AM Tobacco            Objective:   Vital Signs:   Visit Vitals  /88 (Patient Site: Left Arm, Patient Position: Sitting, Cuff Size: Adult Large)   Pulse (!) 57   Ht 5' 7.75\" (1.721 m)   Wt (!) 231 lb 1.6 oz (104.8 kg)   SpO2 97%   BMI 35.40 kg/m           PHYSICAL EXAM  Physical Exam:  General Appearance:  Appears comfortable, Alert, cooperative, no distress,   Head: Normocephalic, without obvious abnormality, atraumatic  Eyes: PERRL, conjunctiva/corneas clear, EOM's intact, both eyes             Nose: Nares normal, no drainage   Throat: Lips, mucosa, and tongue normal; teeth and gums normal  Neck: Supple, symmetrical, trachea midline, no adenopathy;                      Lungs: Clear to auscultation bilaterally, respirations unlabored  Heart: Regular rate and rhythm, S1 and S2 normal, no murmur, rubs or gallop  Extremities: Extremities normal, atraumatic, no cyanosis or edema  Pulses: DP pulses are 1-2+ bilat.    Skin: no rashes or lesions, benign mole on the lateral side of right knee  Neurologic: normal and equal strength bilat in upper and lower extremities         The 10-year ASCVD risk score (Lindsaygary COLVIN Jr., et al., 2013) is: 9.1%    Values used to calculate the score:      Age: 55 " years      Sex: Male      Is Non- : No      Diabetic: No      Tobacco smoker: No      Systolic Blood Pressure: 136 mmHg      Is BP treated: Yes      HDL Cholesterol: 43 mg/dL      Total Cholesterol: 218 mg/dL         Medication List          Accurate as of December 7, 2020 12:54 PM. If you have any questions, ask your nurse or doctor.            CHANGE how you take these medications    amLODIPine 10 MG tablet  Also known as: NORVASC  INSTRUCTIONS: Take 1 tablet (10 mg total) by mouth daily.  Doctor's comments: Please consider 90 day supplies to promote better adherence  What changed:     medication strength    See the new instructions.  Changed by: Dahiana Scott MD           STOP taking these medications    benzonatate 100 MG capsule  Also known as: Tessalon Perles  Stopped by: Dahiana Scott MD     blood pressure monitor Kit  Stopped by: Dahiana Scott MD           Where to Get Your Medications      These medications were sent to [facility] - [place], [place] - [address]   [address] [facility] [address]    Phone: [phone]     amLODIPine 10 MG tablet         Additional Screenings Completed Today:

## 2021-10-16 ENCOUNTER — HEALTH MAINTENANCE LETTER (OUTPATIENT)
Age: 56
End: 2021-10-16

## 2022-01-30 ENCOUNTER — HEALTH MAINTENANCE LETTER (OUTPATIENT)
Age: 57
End: 2022-01-30

## 2022-02-22 ENCOUNTER — OFFICE VISIT (OUTPATIENT)
Dept: FAMILY MEDICINE | Facility: CLINIC | Age: 57
End: 2022-02-22
Payer: COMMERCIAL

## 2022-02-22 VITALS
WEIGHT: 226 LBS | DIASTOLIC BLOOD PRESSURE: 86 MMHG | SYSTOLIC BLOOD PRESSURE: 122 MMHG | HEIGHT: 68 IN | OXYGEN SATURATION: 98 % | HEART RATE: 67 BPM | BODY MASS INDEX: 34.25 KG/M2

## 2022-02-22 DIAGNOSIS — I10 ESSENTIAL HYPERTENSION: ICD-10-CM

## 2022-02-22 DIAGNOSIS — Z12.5 SCREENING FOR MALIGNANT NEOPLASM OF PROSTATE: ICD-10-CM

## 2022-02-22 DIAGNOSIS — Z00.00 ROUTINE MEDICAL EXAM: Primary | ICD-10-CM

## 2022-02-22 PROBLEM — E78.2 MIXED HYPERLIPIDEMIA: Status: ACTIVE | Noted: 2020-02-10

## 2022-02-22 PROBLEM — J40 BRONCHITIS: Status: RESOLVED | Noted: 2020-02-10 | Resolved: 2022-02-22

## 2022-02-22 LAB
ALBUMIN SERPL-MCNC: 4.4 G/DL (ref 3.5–5)
ALP SERPL-CCNC: 74 U/L (ref 45–120)
ALT SERPL W P-5'-P-CCNC: 16 U/L (ref 0–45)
ANION GAP SERPL CALCULATED.3IONS-SCNC: 11 MMOL/L (ref 5–18)
AST SERPL W P-5'-P-CCNC: 17 U/L (ref 0–40)
BILIRUB SERPL-MCNC: 1.5 MG/DL (ref 0–1)
BUN SERPL-MCNC: 12 MG/DL (ref 8–22)
CALCIUM SERPL-MCNC: 9.8 MG/DL (ref 8.5–10.5)
CHLORIDE BLD-SCNC: 104 MMOL/L (ref 98–107)
CHOLEST SERPL-MCNC: 219 MG/DL
CO2 SERPL-SCNC: 25 MMOL/L (ref 22–31)
CREAT SERPL-MCNC: 1.27 MG/DL (ref 0.7–1.3)
FASTING STATUS PATIENT QL REPORTED: ABNORMAL
GFR SERPL CREATININE-BSD FRML MDRD: 66 ML/MIN/1.73M2
GLUCOSE BLD-MCNC: 93 MG/DL (ref 70–125)
HDLC SERPL-MCNC: 44 MG/DL
LDLC SERPL CALC-MCNC: 150 MG/DL
POTASSIUM BLD-SCNC: 4.6 MMOL/L (ref 3.5–5)
PROT SERPL-MCNC: 7 G/DL (ref 6–8)
PSA SERPL-MCNC: 0.75 UG/L (ref 0–3.5)
SODIUM SERPL-SCNC: 140 MMOL/L (ref 136–145)
TRIGL SERPL-MCNC: 125 MG/DL

## 2022-02-22 PROCEDURE — 80053 COMPREHEN METABOLIC PANEL: CPT | Performed by: FAMILY MEDICINE

## 2022-02-22 PROCEDURE — 90750 HZV VACC RECOMBINANT IM: CPT | Performed by: FAMILY MEDICINE

## 2022-02-22 PROCEDURE — G0103 PSA SCREENING: HCPCS | Performed by: FAMILY MEDICINE

## 2022-02-22 PROCEDURE — 36415 COLL VENOUS BLD VENIPUNCTURE: CPT | Performed by: FAMILY MEDICINE

## 2022-02-22 PROCEDURE — 99396 PREV VISIT EST AGE 40-64: CPT | Mod: 25 | Performed by: FAMILY MEDICINE

## 2022-02-22 PROCEDURE — 90471 IMMUNIZATION ADMIN: CPT | Performed by: FAMILY MEDICINE

## 2022-02-22 PROCEDURE — 80061 LIPID PANEL: CPT | Performed by: FAMILY MEDICINE

## 2022-02-22 PROCEDURE — 96127 BRIEF EMOTIONAL/BEHAV ASSMT: CPT | Performed by: FAMILY MEDICINE

## 2022-02-22 NOTE — PROGRESS NOTES
"  ASSESSMENT/PLAN:   (Z00.00) Routine medical exam  (primary encounter diagnosis)  Comment: Generally the patient is doing very well.  We discussed healthy lifestyles, including adequate exercise (3-4 times a week for 20-30 minutes), and a healthy diet.  Patient should return for annual physicals, and we can also see them here as needed.       (I10) Essential hypertension  Comment:   Plan: Lipid panel reflex to direct LDL Fasting,         Comprehensive metabolic panel            (Z12.5) Screening for malignant neoplasm of prostate  Comment:   Plan: Prostate Specific Antigen Screen              Patient has been advised of split billing requirements and indicates understanding: Yes    COUNSELING:   Reviewed preventive health counseling, as reflected in patient instructions       Regular exercise       Healthy diet/nutrition       Immunizations    Vaccinated for: Zoster             Alcohol Use        Colorectal cancer screening       Prostate cancer screening    Estimated body mass index is 36.39 kg/m  as calculated from the following:    Height as of 2/8/21: 1.721 m (5' 7.75\").    Weight as of 2/8/21: 107.8 kg (237 lb 9.6 oz).     Weight management plan: Discussed healthy diet and exercise guidelines    He reports that he has never smoked. He has never used smokeless tobacco.        SUBJECTIVE:   CC: Toni Mcclelland is an 56 year old male who presents for preventative health visit.     Patient has been advised of split billing requirements and indicates understanding: Yes  Healthy Habits:     Getting at least 3 servings of Calcium per day:  Yes    Bi-annual eye exam:  Yes    Dental care twice a year:  Yes    Sleep apnea or symptoms of sleep apnea:  None    Diet:  Regular (no restrictions)    Frequency of exercise:  4-5 days/week    Duration of exercise:  45-60 minutes    Taking medications regularly:  Yes    Medication side effects:  None    PHQ-2 Total Score: 0    Additional concerns today:  No              Today's " PHQ-2 Score:   PHQ-2 ( 1999 Pfizer) 2/22/2022   Q1: Little interest or pleasure in doing things 0   Q2: Feeling down, depressed or hopeless 0   PHQ-2 Score 0   Q1: Little interest or pleasure in doing things Not at all   Q2: Feeling down, depressed or hopeless Not at all   PHQ-2 Score 0       Abuse: Current or Past(Physical, Sexual or Emotional)- No  Do you feel safe in your environment? Yes        Social History     Tobacco Use     Smoking status: Never Smoker     Smokeless tobacco: Never Used   Substance Use Topics     Alcohol use: Yes     Alcohol/week: 1.0 - 2.0 standard drink     Comment: 1-2 / week         Alcohol Use 2/22/2022   Prescreen: >3 drinks/day or >7 drinks/week? No       Last PSA:   Prostate Specific Antigen Screen   Date Value Ref Range Status   02/22/2022 0.75 0.00 - 3.50 ug/L Final       Reviewed orders with patient. Reviewed health maintenance and updated orders accordingly - Yes  Labs reviewed in EPIC  BP Readings from Last 3 Encounters:   02/22/22 122/86   02/08/21 130/84   12/07/20 136/88    Wt Readings from Last 3 Encounters:   02/22/22 102.5 kg (226 lb)   02/08/21 107.8 kg (237 lb 9.6 oz)   12/07/20 104.8 kg (231 lb 1.6 oz)                  Patient Active Problem List   Diagnosis     Diverticulosis of colon without diverticulitis     Mixed hyperlipidemia     Obesity (BMI 35.0-39.9) with comorbidity (H)     Essential hypertension     Past Surgical History:   Procedure Laterality Date     APPENDECTOMY       ARTHROSCOPY ANKLE Right 12/15/2016    Procedure: ANKLE ARTHROSCOPY;  Surgeon: Donny Hoyt DPM;  Location: [facility] OR;  Service:      ARTHROTOMY ANKLE Right 12/15/2016    Procedure: OPEN ARTHROTOMY, LATERAL ANKLE STABLIZATION WITH TENDON GRAFT, RIGHT ANKLE;  Surgeon: Donny Hoyt DPM;  Location: [facility] OR;  Service:      HERNIA REPAIR       OTHER SURGICAL HISTORY      OTHER SURGICAL HISTORYtelescopic bowel age 5       Social History     Tobacco Use     Smoking status:  Never Smoker     Smokeless tobacco: Never Used   Substance Use Topics     Alcohol use: Yes     Alcohol/week: 1.0 - 2.0 standard drink     Comment: 1-2 / week     Family History   Problem Relation Age of Onset     Heart Disease Mother      Memory loss Mother      Heart Disease Father      Early Death Father      No Known Problems Sister      No Known Problems Son      Colon Cancer Maternal Grandmother      Colon Cancer Paternal Grandfather          Current Outpatient Medications   Medication Sig Dispense Refill     amLODIPine (NORVASC) 10 MG tablet [AMLODIPINE (NORVASC) 10 MG TABLET] Take 1 tablet (10 mg total) by mouth daily. 90 tablet 4     No Known Allergies    Reviewed and updated as needed this visit by clinical staff   Tobacco  Allergies  Meds  Problems  Med Hx  Surg Hx  Fam Hx  Soc   Hx        Reviewed and updated as needed this visit by Provider   Tobacco  Allergies  Meds  Problems  Med Hx  Surg Hx  Fam Hx  Soc   Hx       Past Medical History:   Diagnosis Date     Hypertension       Past Surgical History:   Procedure Laterality Date     APPENDECTOMY       ARTHROSCOPY ANKLE Right 12/15/2016    Procedure: ANKLE ARTHROSCOPY;  Surgeon: Donny Hoyt DPM;  Location: [facility] OR;  Service:      ARTHROTOMY ANKLE Right 12/15/2016    Procedure: OPEN ARTHROTOMY, LATERAL ANKLE STABLIZATION WITH TENDON GRAFT, RIGHT ANKLE;  Surgeon: Donny Hoyt DPM;  Location: [facility] OR;  Service:      HERNIA REPAIR       OTHER SURGICAL HISTORY      OTHER SURGICAL HISTORYtelescopic bowel age 5       Review of Systems  CONSTITUTIONAL: NEGATIVE for fever, chills, change in weight  INTEGUMENTARY/SKIN: NEGATIVE for worrisome rashes, moles or lesions  EYES: NEGATIVE for vision changes or irritation  ENT: NEGATIVE for ear, mouth and throat problems  RESP: NEGATIVE for significant cough or SOB  CV: NEGATIVE for chest pain, palpitations or peripheral edema  GI: NEGATIVE for nausea, abdominal pain, heartburn, or  "change in bowel habits   male: negative for dysuria, hematuria, decreased urinary stream, erectile dysfunction, urethral discharge  MUSCULOSKELETAL: NEGATIVE for significant arthralgias or myalgia  NEURO: NEGATIVE for weakness, dizziness or paresthesias  PSYCHIATRIC: NEGATIVE for changes in mood or affect    OBJECTIVE:   /86 (BP Location: Left arm, Patient Position: Sitting, Cuff Size: Adult Large)   Pulse 67   Ht 1.715 m (5' 7.5\")   Wt 102.5 kg (226 lb)   SpO2 98%   BMI 34.87 kg/m      Physical Exam  GENERAL: healthy, alert and no distress  EYES: Eyes grossly normal to inspection, PERRL and conjunctivae and sclerae normal  HENT: ear canals and TM's normal, nose and mouth without ulcers or lesions  NECK: no adenopathy, no asymmetry, masses, or scars and thyroid normal to palpation  RESP: lungs clear to auscultation - no rales, rhonchi or wheezes  CV: regular rate and rhythm, normal S1 S2, no S3 or S4, no murmur, click or rub, no peripheral edema and peripheral pulses strong  ABDOMEN: soft, nontender, no hepatosplenomegaly, no masses and bowel sounds normal  MS: no gross musculoskeletal defects noted, no edema  SKIN: no suspicious lesions or rashes  NEURO: Normal strength and tone, mentation intact and speech normal  PSYCH: mentation appears normal, affect normal/bright    Diagnostic Test Results:  Labs reviewed in Epic  Results for orders placed or performed in visit on 02/22/22   Lipid panel reflex to direct LDL Fasting     Status: Abnormal   Result Value Ref Range    Cholesterol 219 (H) <=199 mg/dL    Triglycerides 125 <=149 mg/dL    Direct Measure HDL 44 >=40 mg/dL    LDL Cholesterol Calculated 150 (H) <=129 mg/dL    Patient Fasting > 8hrs? Unknown    Comprehensive metabolic panel     Status: Abnormal   Result Value Ref Range    Sodium 140 136 - 145 mmol/L    Potassium 4.6 3.5 - 5.0 mmol/L    Chloride 104 98 - 107 mmol/L    Carbon Dioxide (CO2) 25 22 - 31 mmol/L    Anion Gap 11 5 - 18 mmol/L    Urea " Nitrogen 12 8 - 22 mg/dL    Creatinine 1.27 0.70 - 1.30 mg/dL    Calcium 9.8 8.5 - 10.5 mg/dL    Glucose 93 70 - 125 mg/dL    Alkaline Phosphatase 74 45 - 120 U/L    AST 17 0 - 40 U/L    ALT 16 0 - 45 U/L    Protein Total 7.0 6.0 - 8.0 g/dL    Albumin 4.4 3.5 - 5.0 g/dL    Bilirubin Total 1.5 (H) 0.0 - 1.0 mg/dL    GFR Estimate 66 >60 mL/min/1.73m2   Prostate Specific Antigen Screen     Status: Normal   Result Value Ref Range    Prostate Specific Antigen Screen 0.75 0.00 - 3.50 ug/L    Narrative    Assay Method is Abbott Prostate-Specific Antigen (PSA)  Standard-WHO 1st International (90:10)       Shemar Apodaca MD  [facility]

## 2022-03-08 DIAGNOSIS — I10 ESSENTIAL HYPERTENSION: ICD-10-CM

## 2022-03-08 RX ORDER — AMLODIPINE BESYLATE 10 MG/1
10 TABLET ORAL DAILY
Qty: 90 TABLET | Refills: 3 | Status: SHIPPED | OUTPATIENT
Start: 2022-03-08 | End: 2023-04-04

## 2022-04-27 NOTE — PROGRESS NOTES
Brief Postoperative Note    Patient: Cate Hawkins Sr. YOB: 1977  MRN: [id number]    Date of Procedure: 4/27/2022     Pre-Op Diagnosis: TRANSVERSE COLON ADENOCARCINOMA, DIVERTING LOOP ILEOSTOMY    Post-Op Diagnosis: TRANSVERSE COLON ADENOCARCINOMA, LOOP ILEOSTOMY, CARCINOMATOSIS      Procedure(s):  Diagnotic laparoscopy with peritoneal biopsy    Surgeon(s): Chris Trinh MD    Surgical Assistant: Physician Assistant: EVELIA Hebert    Anesthesia: General     Estimated Blood Loss (mL): 10 mL    Complications: None    Specimens:   ID Type Source Tests Collected by Time Destination   1 : Peritoneal nodule Frozen Section Peritoneum  Chris Trinh MD 4/27/2022 1218 Pathology        Implants: * No implants in log *    Drains: * No LDAs found *    Findings: Peritoneal carcinomatosis with nodules on diaphragm, peritoneum and a small amount of disease on distal small bowel near ileostomy. No obvious liver disease noted. No ascites. Large mass at hepatic flexure was fixed to retroperitoneum and several loops of small bowel. Duodenum not visualized. No evidence of small bowel obstruction. Ileostomy left in place.       Electronically Signed by Isabela Chacon MD on 4/27/2022 at 12:56 PM [facility] Discharge Summary    Patient Name: Toni Mcclelland  Date: 5/2/2017  Referring provider: Donny Hoyt DPM  Visit Diagnosis:   1. Acute right ankle pain     2. Decreased ROM of ankle     3. Right ankle swelling     4. Gait, antalgic     5. Ankle weakness         Goals:  Pt. will demonstrate/verbalize independence in self-management of condition in : 6 weeks;Met  Pt. will be able to walk : on uneven/inclined surfaces;for community mobility;Met  Patient will ascend / descend: stairs;with less pain;with less difficulty;in 6 weeks;Met  Patient will increase : LEFS score;for improved quality of function;in 6 weeks;Met  Pt will: perform daily tasks and light housework with increased ease, ankle stability and pain <2/10; in 6 weeks ; Met    Patient was seen for 8 visits with the last visit on 4/4/17.    The patient met goals and has demonstrated understanding of and independence in the home program for self-care, and progression to next steps.  He will initiate contact if questions or concerns arise.    Therapy will be discontinued at this time.  The patient will need a new referral to resume.    Thank you for your referral.  Benita Holland  5/2/2017  5:20 PM

## 2022-08-22 ENCOUNTER — VIRTUAL VISIT (OUTPATIENT)
Dept: FAMILY MEDICINE | Facility: CLINIC | Age: 57
End: 2022-08-22
Payer: COMMERCIAL

## 2022-08-22 DIAGNOSIS — I10 ESSENTIAL HYPERTENSION: Primary | ICD-10-CM

## 2022-08-22 DIAGNOSIS — R05.9 COUGH: ICD-10-CM

## 2022-08-22 DIAGNOSIS — M79.10 MYALGIA: ICD-10-CM

## 2022-08-22 DIAGNOSIS — R50.9 FEVER, UNSPECIFIED FEVER CAUSE: ICD-10-CM

## 2022-08-22 DIAGNOSIS — U07.1 INFECTION DUE TO 2019 NOVEL CORONAVIRUS: ICD-10-CM

## 2022-08-22 PROCEDURE — 99213 OFFICE O/P EST LOW 20 MIN: CPT | Mod: 95 | Performed by: FAMILY MEDICINE

## 2022-08-22 NOTE — PROGRESS NOTES
"Christopher is a 57 year old who is being evaluated via a billable video visit.      How would you like to obtain your AVS? MyChart  If the video visit is dropped, the invitation should be resent by: Text to cell phone: [phone]  Will anyone else be joining your video visit? No          Assessment & Plan   Problem List Items Addressed This Visit        Circulatory    Essential hypertension - Primary      Other Visit Diagnoses     Cough        Relevant Medications    nirmatrelvir and ritonavir (PAXLOVID) therapy pack    Fever, unspecified fever cause        Relevant Medications    nirmatrelvir and ritonavir (PAXLOVID) therapy pack    Myalgia        Relevant Medications    nirmatrelvir and ritonavir (PAXLOVID) therapy pack    Infection due to 2019 novel coronavirus        Relevant Medications    nirmatrelvir and ritonavir (PAXLOVID) therapy pack         Patient with positive COVID-19 and risk factor of hypertension.  Symptoms for started within 5 days and patient qualifies for treatment with Paxlovid.  Decrease amlodipine to 5 mg daily which will be half tablet.  Do this for 10 days.  Then resume amlodipine 10 mg daily  Paxlovid is 3 tablets twice daily for 5 days  Discussed quarantine  Patient verbalized understanding           BMI:   Estimated body mass index is 34.87 kg/m  as calculated from the following:    Height as of 2/22/22: 1.715 m (5' 7.5\").    Weight as of 2/22/22: 102.5 kg (226 lb).           No follow-ups on file.    Marisa Dailey MD  [facility]    Subjective   Christopher is a 57 year old presenting for the following health issues: tested positive for COVID yesterday 8/21/22, symptoms started 8/20/22, c/o chills, fever, myalgia, irritated throat,scratchy, soft stools  Covid Concern, Fever, and Musculoskeletal Problem      HPI   Patient is a 57-year-old male who first got a scratchy throat on Saturday, August 20.  The next day, yesterday, he had myalgias, night sweats, chills, fever " and cough.  No shortness of breath  He was also at the [place] state [place] at a concert Saturday night.  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  He was on the floor with active COVID patients.  He was wearing a mask.        Review of Systems   Negative except as listed in HPI        Objective           Vitals:  No vitals were obtained today due to virtual visit.    Physical Exam   GENERAL: Healthy, alert and no distress  EYES: Eyes grossly normal to inspection.  No discharge or erythema, or obvious scleral/conjunctival abnormalities.  RESP: No audible wheeze, cough, or visible cyanosis.  No visible retractions or increased work of breathing.    SKIN: Visible skin clear. No significant rash, abnormal pigmentation or lesions.  NEURO: Cranial nerves grossly intact.  Mentation and speech appropriate for age.  PSYCH: Mentation appears normal, affect normal/bright, judgement and insight intact, normal speech and appearance well-groomed.                Video-Visit Details    Video Start Time: 11:42 AM    Type of service:  Video Visit    Video End Time:11:54 AM    Originating Location (pt. Location): Other In his car in [place]    Distant Location (provider location):  [facility]     Platform used for Video Visit: Valarie Navarrete

## 2022-09-25 ENCOUNTER — HEALTH MAINTENANCE LETTER (OUTPATIENT)
Age: 57
End: 2022-09-25

## 2023-04-04 ENCOUNTER — OFFICE VISIT (OUTPATIENT)
Dept: FAMILY MEDICINE | Facility: CLINIC | Age: 58
End: 2023-04-04
Payer: COMMERCIAL

## 2023-04-04 ENCOUNTER — ANCILLARY PROCEDURE (OUTPATIENT)
Dept: GENERAL RADIOLOGY | Facility: CLINIC | Age: 58
End: 2023-04-04
Attending: PHYSICIAN ASSISTANT
Payer: COMMERCIAL

## 2023-04-04 VITALS
OXYGEN SATURATION: 99 % | WEIGHT: 223.8 LBS | HEIGHT: 68 IN | RESPIRATION RATE: 16 BRPM | DIASTOLIC BLOOD PRESSURE: 80 MMHG | HEART RATE: 72 BPM | TEMPERATURE: 98.3 F | BODY MASS INDEX: 33.92 KG/M2 | SYSTOLIC BLOOD PRESSURE: 136 MMHG

## 2023-04-04 DIAGNOSIS — Z13.1 SCREENING FOR DIABETES MELLITUS: ICD-10-CM

## 2023-04-04 DIAGNOSIS — M25.571 PAIN IN JOINT INVOLVING ANKLE AND FOOT, RIGHT: ICD-10-CM

## 2023-04-04 DIAGNOSIS — E78.2 MIXED HYPERLIPIDEMIA: ICD-10-CM

## 2023-04-04 DIAGNOSIS — M25.522 LEFT ELBOW PAIN: ICD-10-CM

## 2023-04-04 DIAGNOSIS — Z00.00 ANNUAL PHYSICAL EXAM: Primary | ICD-10-CM

## 2023-04-04 DIAGNOSIS — Z12.5 SCREENING FOR PROSTATE CANCER: ICD-10-CM

## 2023-04-04 DIAGNOSIS — I10 ESSENTIAL HYPERTENSION: ICD-10-CM

## 2023-04-04 DIAGNOSIS — E66.01 MORBID OBESITY (H): ICD-10-CM

## 2023-04-04 LAB
ALBUMIN SERPL BCG-MCNC: 4.9 G/DL (ref 3.5–5.2)
ALP SERPL-CCNC: 72 U/L (ref 40–129)
ALT SERPL W P-5'-P-CCNC: 17 U/L (ref 10–50)
ANION GAP SERPL CALCULATED.3IONS-SCNC: 12 MMOL/L (ref 7–15)
AST SERPL W P-5'-P-CCNC: 23 U/L (ref 10–50)
BILIRUB SERPL-MCNC: 0.9 MG/DL
BUN SERPL-MCNC: 15.1 MG/DL (ref 6–20)
CALCIUM SERPL-MCNC: 9.9 MG/DL (ref 8.6–10)
CHLORIDE SERPL-SCNC: 103 MMOL/L (ref 98–107)
CHOLEST SERPL-MCNC: 219 MG/DL
CREAT SERPL-MCNC: 1.22 MG/DL (ref 0.67–1.17)
DEPRECATED HCO3 PLAS-SCNC: 23 MMOL/L (ref 22–29)
ERYTHROCYTE [DISTWIDTH] IN BLOOD BY AUTOMATED COUNT: 12 % (ref 10–15)
GFR SERPL CREATININE-BSD FRML MDRD: 69 ML/MIN/1.73M2
GLUCOSE SERPL-MCNC: 111 MG/DL (ref 70–99)
HBA1C MFR BLD: 5.9 % (ref 0–5.6)
HCT VFR BLD AUTO: 48.9 % (ref 40–53)
HDLC SERPL-MCNC: 49 MG/DL
HGB BLD-MCNC: 17.2 G/DL (ref 13.3–17.7)
LDLC SERPL CALC-MCNC: 148 MG/DL
MCH RBC QN AUTO: 31.3 PG (ref 26.5–33)
MCHC RBC AUTO-ENTMCNC: 35.2 G/DL (ref 31.5–36.5)
MCV RBC AUTO: 89 FL (ref 78–100)
NONHDLC SERPL-MCNC: 170 MG/DL
PLATELET # BLD AUTO: 256 10E3/UL (ref 150–450)
POTASSIUM SERPL-SCNC: 4.3 MMOL/L (ref 3.4–5.3)
PROT SERPL-MCNC: 7.7 G/DL (ref 6.4–8.3)
PSA SERPL DL<=0.01 NG/ML-MCNC: 0.89 NG/ML (ref 0–3.5)
RBC # BLD AUTO: 5.49 10E6/UL (ref 4.4–5.9)
SODIUM SERPL-SCNC: 138 MMOL/L (ref 136–145)
TRIGL SERPL-MCNC: 112 MG/DL
WBC # BLD AUTO: 8 10E3/UL (ref 4–11)

## 2023-04-04 PROCEDURE — 80061 LIPID PANEL: CPT | Performed by: PHYSICIAN ASSISTANT

## 2023-04-04 PROCEDURE — 73080 X-RAY EXAM OF ELBOW: CPT | Mod: TC | Performed by: RADIOLOGY

## 2023-04-04 PROCEDURE — 36415 COLL VENOUS BLD VENIPUNCTURE: CPT | Performed by: PHYSICIAN ASSISTANT

## 2023-04-04 PROCEDURE — 80053 COMPREHEN METABOLIC PANEL: CPT | Performed by: PHYSICIAN ASSISTANT

## 2023-04-04 PROCEDURE — 85027 COMPLETE CBC AUTOMATED: CPT | Performed by: PHYSICIAN ASSISTANT

## 2023-04-04 PROCEDURE — 99396 PREV VISIT EST AGE 40-64: CPT | Performed by: PHYSICIAN ASSISTANT

## 2023-04-04 PROCEDURE — 83036 HEMOGLOBIN GLYCOSYLATED A1C: CPT | Performed by: PHYSICIAN ASSISTANT

## 2023-04-04 PROCEDURE — G0103 PSA SCREENING: HCPCS | Performed by: PHYSICIAN ASSISTANT

## 2023-04-04 RX ORDER — AMLODIPINE BESYLATE 10 MG/1
10 TABLET ORAL DAILY
Qty: 90 TABLET | Refills: 3 | Status: SHIPPED | OUTPATIENT
Start: 2023-04-04 | End: 2024-03-13

## 2023-04-04 ASSESSMENT — ENCOUNTER SYMPTOMS
FREQUENCY: 0
COUGH: 0
HEMATOCHEZIA: 0
NERVOUS/ANXIOUS: 1
HEARTBURN: 0
FEVER: 0
JOINT SWELLING: 0
PARESTHESIAS: 0
WEAKNESS: 0
CONSTIPATION: 0
HEMATURIA: 0
DIZZINESS: 0
ARTHRALGIAS: 1
SHORTNESS OF BREATH: 0
HEADACHES: 0
ABDOMINAL PAIN: 0
DYSURIA: 0
EYE PAIN: 0
NAUSEA: 0
SORE THROAT: 0
PALPITATIONS: 0
DIARRHEA: 0
MYALGIAS: 0
CHILLS: 0

## 2023-04-04 ASSESSMENT — PAIN SCALES - GENERAL: PAINLEVEL: NO PAIN (0)

## 2023-04-04 NOTE — PROGRESS NOTES
SUBJECTIVE:   CC: Christopher is an 57 year old who presents for preventative health visit.       4/4/2023     7:54 AM   Additional Questions   Roomed by Lucina ALEX LPN   Patient has been advised of split billing requirements and indicates understanding: Yes  Tacho is a pleasant 57-year-old male who presents to the clinic today for annual physical.  Past medical history significant for hypertension, hyperlipidemia and obesity.  He is currently on amlodipine for blood pressure management.  No concerns with anxiety or depression.  No excessive alcohol use.  He is staying active 2-3 times a week walking a mile most days of the week.  He does enjoy playing hockey during the winter months.  He is not a smoker.    He did undergo right ankle surgery about 7 years ago.  At that time they did place pins and replace some tendons in his right ankle.  Over the last year or so the pain has gotten a little bit worse.  No new injury that he can think of.  He seeing [facility] for this surgery and would like to followed up with them at that spent some time    He has noticed some left elbow pain for about a month.  He states when he is doing flexion and extension he feels a catching and feels a sharp pain in the elbow.  He has not noticed any redness, swelling or any sores to the elbow.  No injury that he can think of.    Healthy Habits:     Getting at least 3 servings of Calcium per day:  NO    Bi-annual eye exam:  Yes    Dental care twice a year:  Yes    Sleep apnea or symptoms of sleep apnea:  None    Diet:  Regular (no restrictions)    Frequency of exercise:  2-3 days/week    Duration of exercise:  15-30 minutes    Taking medications regularly:  Yes    Medication side effects:  None    PHQ-2 Total Score: 0    Additional concerns today:  No      Today's PHQ-2 Score:       4/4/2023     7:37 AM   PHQ-2 ( 1999 Pfizer)   Q1: Little interest or pleasure in doing things 0   Q2: Feeling down, depressed or hopeless 0   PHQ-2 Score 0    Q1: Little interest or pleasure in doing things Not at all    Not at all   Q2: Feeling down, depressed or hopeless Not at all    Not at all   PHQ-2 Score 0    0           Social History     Tobacco Use     Smoking status: Never     Passive exposure: Never     Smokeless tobacco: Never   Vaping Use     Vaping status: Never Used   Substance Use Topics     Alcohol use: Yes     Alcohol/week: 1.0 - 2.0 standard drink of alcohol     Comment: 1-2 / week             4/4/2023     7:37 AM   Alcohol Use   Prescreen: >3 drinks/day or >7 drinks/week? No       Last PSA:   Prostate Specific Antigen Screen   Date Value Ref Range Status   02/22/2022 0.75 0.00 - 3.50 ug/L Final       Reviewed orders with patient. Reviewed health maintenance and updated orders accordingly - Yes  Lab work is in process    Reviewed and updated as needed this visit by clinical staff   Tobacco  Allergies  Meds              Reviewed and updated as needed this visit by Provider                 Past Medical History:   Diagnosis Date     Hypertension       Past Surgical History:   Procedure Laterality Date     APPENDECTOMY       ARTHROSCOPY ANKLE Right 12/15/2016    Procedure: ANKLE ARTHROSCOPY;  Surgeon: Donny Hoyt DPM;  Location: [facility];  Service:      ARTHROTOMY ANKLE Right 12/15/2016    Procedure: OPEN ARTHROTOMY, LATERAL ANKLE STABLIZATION WITH TENDON GRAFT, RIGHT ANKLE;  Surgeon: Donny Hoyt DPM;  Location: [facility] OR;  Service:      HERNIA REPAIR       OTHER SURGICAL HISTORY      OTHER SURGICAL HISTORYtelescopic bowel age 5       Review of Systems   Constitutional: Negative for chills and fever.   HENT: Negative for congestion, ear pain, hearing loss and sore throat.    Eyes: Negative for pain and visual disturbance.   Respiratory: Negative for cough and shortness of breath.    Cardiovascular: Negative for chest pain, palpitations and peripheral edema.   Gastrointestinal: Negative for abdominal pain, constipation, diarrhea,  "heartburn, hematochezia and nausea.   Genitourinary: Negative for dysuria, frequency, genital sores, hematuria, impotence, penile discharge and urgency.   Musculoskeletal: Positive for arthralgias. Negative for joint swelling and myalgias.        Chronic right ankle pain     Left elbow pain x 1 month. Clicking feeling. No injury   Skin: Negative for rash.   Neurological: Negative for dizziness, weakness, headaches and paresthesias.   Psychiatric/Behavioral: Negative for mood changes. The patient is nervous/anxious.        OBJECTIVE:   /80   Pulse 72   Temp 98.3  F (36.8  C) (Oral)   Resp 16   Ht 1.715 m (5' 7.5\")   Wt 101.5 kg (223 lb 12.8 oz)   SpO2 99%   BMI 34.53 kg/m      Physical Exam  Vitals and nursing note reviewed.   Constitutional:       General: He is not in acute distress.     Appearance: Normal appearance. He is well-developed and well-groomed. He is not ill-appearing or toxic-appearing.   HENT:      Head: Normocephalic and atraumatic.      Right Ear: Tympanic membrane, ear canal and external ear normal.      Left Ear: Tympanic membrane, ear canal and external ear normal.      Mouth/Throat:      Lips: Pink.      Mouth: Mucous membranes are moist.      Palate: No mass.      Pharynx: Oropharynx is clear. Uvula midline.      Tonsils: No tonsillar exudate or tonsillar abscesses.   Eyes:      General: Lids are normal.         Right eye: No discharge.         Left eye: No discharge.   Neck:      Trachea: Trachea normal.   Cardiovascular:      Rate and Rhythm: Normal rate and regular rhythm.      Heart sounds: S1 normal and S2 normal. No murmur heard.  Pulmonary:      Effort: Pulmonary effort is normal.      Breath sounds: Normal breath sounds and air entry.   Abdominal:      General: Bowel sounds are normal. There is no distension.      Palpations: Abdomen is soft.      Tenderness: There is no abdominal tenderness. There is no guarding or rebound.   Musculoskeletal:      Left elbow: No swelling, " deformity, effusion or lacerations. Normal range of motion. No tenderness.      Cervical back: Full passive range of motion without pain and neck supple.      Right lower leg: No edema.      Left lower leg: No edema.      Right ankle: No swelling or lacerations. No tenderness. Normal range of motion. Anterior drawer test negative. Normal pulse.   Lymphadenopathy:      Cervical: No cervical adenopathy.   Skin:     General: Skin is warm and dry.      Findings: No lesion or rash.   Neurological:      General: No focal deficit present.      Mental Status: He is alert.      Cranial Nerves: No cranial nerve deficit.      Gait: Gait is intact.      Deep Tendon Reflexes:      Reflex Scores:       Patellar reflexes are 2+ on the right side and 2+ on the left side.  Psychiatric:         Attention and Perception: Attention normal.         Mood and Affect: Mood normal.         Speech: Speech normal.         ASSESSMENT/PLAN:       ICD-10-CM    1. Annual physical exam  Z00.00 CBC with Platelets       COMPREHENSIVE METABOLIC PANEL     HEMOGLOBIN A1C     PROSTATE SPEC ANTIGEN SCREEN     Lipid panel reflex to direct LDL Fasting     CBC with Platelets       COMPREHENSIVE METABOLIC PANEL     HEMOGLOBIN A1C     PROSTATE SPEC ANTIGEN SCREEN     Lipid panel reflex to direct LDL Fasting      2. Essential hypertension  I10 amLODIPine (NORVASC) 10 MG tablet      3. Mixed hyperlipidemia  E78.2 Lipid panel reflex to direct LDL Fasting     Lipid panel reflex to direct LDL Fasting      4. Screening for prostate cancer  Z12.5 HEMOGLOBIN A1C     HEMOGLOBIN A1C      5. Screening for diabetes mellitus  Z13.1 PROSTATE SPEC ANTIGEN SCREEN     PROSTATE SPEC ANTIGEN SCREEN      6. Morbid obesity (H)  E66.01       7. Pain in joint involving ankle and foot, right  M25.571 Orthopedic  Referral      8. Left elbow pain  M25.522 Orthopedic  Referral     CANCELED: XR Elbow Left G/E 3 Views        #1 annual physical  We will update screening  "labs including a CBC, complete metabolic panel, lipid, A1c, and PSA.    #2 hypertension  Blood pressure at goal at 136/80.  Continue on the amlodipine.  We did discuss diet and exercise and he will be working on this    #3 hyperlipidemia  Last lipid panel 2/22/2022 showed an LDL of 150, triglycerides of 125, and HDL of 47.  We discussed diet and exercise today.  We will check a fasting lipid panel today    #4 right ankle pain  We will have him follow-up with [facility] for evaluation.  He is to monitor symptoms in the meantime    #5 left elbow pain  No redness, warmth or swelling noted to the elbow today.  No palpable tenderness.  Majority of the pain is with flexion extension and he notes it as a clicking/catching and a sharp pain.  We will start with getting an x-ray today.  Recommended Tylenol ibuprofen and ice in the meantime    #6 colon cancer screening  He is up-to-date on colonoscopy.  Next colonoscopy due 526    #7 prostate cancer screening  We will check a PSA level today    #8 obesity  BMI 34 today.  We discussed diet and exercise.  He will be working on this.    Patient has been advised of split billing requirements and indicates understanding: Yes      COUNSELING:   Reviewed preventive health counseling, as reflected in patient instructions       Regular exercise       Healthy diet/nutrition       Vision screening       Colorectal cancer screening       Prostate cancer screening      BMI:   Estimated body mass index is 34.53 kg/m  as calculated from the following:    Height as of this encounter: 1.715 m (5' 7.5\").    Weight as of this encounter: 101.5 kg (223 lb 12.8 oz).   Weight management plan: Discussed healthy diet and exercise guidelines      He reports that he has never smoked. He has never been exposed to tobacco smoke. He has never used smokeless tobacco.              Mal Ariza PA-C  [facility]  "

## 2023-06-21 ENCOUNTER — TRANSFERRED RECORDS (OUTPATIENT)
Dept: HEALTH INFORMATION MANAGEMENT | Facility: CLINIC | Age: 58
End: 2023-06-21
Payer: COMMERCIAL

## 2023-07-19 ENCOUNTER — TRANSFERRED RECORDS (OUTPATIENT)
Dept: HEALTH INFORMATION MANAGEMENT | Facility: CLINIC | Age: 58
End: 2023-07-19
Payer: COMMERCIAL

## 2023-09-18 ENCOUNTER — OFFICE VISIT (OUTPATIENT)
Dept: FAMILY MEDICINE | Facility: CLINIC | Age: 58
End: 2023-09-18
Payer: COMMERCIAL

## 2023-09-18 VITALS
HEIGHT: 68 IN | RESPIRATION RATE: 16 BRPM | WEIGHT: 217.3 LBS | BODY MASS INDEX: 32.93 KG/M2 | SYSTOLIC BLOOD PRESSURE: 142 MMHG | DIASTOLIC BLOOD PRESSURE: 86 MMHG | TEMPERATURE: 97.4 F | HEART RATE: 74 BPM | OXYGEN SATURATION: 96 %

## 2023-09-18 DIAGNOSIS — M25.521 RIGHT ELBOW PAIN: Primary | ICD-10-CM

## 2023-09-18 PROCEDURE — 99213 OFFICE O/P EST LOW 20 MIN: CPT

## 2023-09-18 NOTE — H&P (VIEW-ONLY)
[address] [address]  Phone: [phone]  Fax: [phone]  Primary Provider: Mal Ariza  Pre-op Performing Provider: TAD LAYTON      PREOPERATIVE EVALUATION:  Today's date: 9/18/2023    Toni Mcclelland is a 58 year old male who presents for a preoperative evaluation.      9/18/2023     8:39 AM   Additional Questions   Roomed by JESSICA Alexander       Surgical Information:  Surgery/Procedure: Right Elbow Arthroscopy with removal loose bodies, synovectomy and debridement   Surgery Location: [facility]  Surgeon: Paulo Weiss MD  Surgery Date: 09/26/2023  Time of Surgery: 1:00 pm   Where patient plans to recover: At home with family  Fax number for surgical facility: Note does not need to be faxed, will be available electronically in Epic.    Assessment & Plan     The proposed surgical procedure is considered INTERMEDIATE risk.    Right elbow pain  Patient in for preop for right elbow arthroscopy with removal of loose bodies, synovectomy and debridement.  No abnormal findings on general physical exam.  Patient exercises regularly playing hockey.     - No identified additional risk factors other than previously addressed    Antiplatelet or Anticoagulation Medication Instructions:   - Patient is on no antiplatelet or anticoagulation medications.    Additional Medication Instructions:  Patient is to take all scheduled medications on the day of surgery    RECOMMENDATION:  APPROVAL GIVEN to proceed with proposed procedure, without further diagnostic evaluation.            Subjective       HPI related to upcoming procedure: Right elbow pain for past 4 months. Has cartilidge loose in joint space causing pain, as well as bone spurs.         9/18/2023     7:31 AM   Preop Questions   1. Have you ever had a heart attack or stroke? No   2. Have you ever had surgery on your heart or blood vessels, such as a stent placement, a  coronary artery bypass, or surgery on an artery in your head, neck, heart, or legs? No   3. Do you have chest pain with activity? No   4. Do you have a history of  heart failure? No   5. Do you currently have a cold, bronchitis or symptoms of other infection? No   6. Do you have a cough, shortness of breath, or wheezing? No   7. Do you or anyone in your family have previous history of blood clots? No   8. Do you or does anyone in your family have a serious bleeding problem such as prolonged bleeding following surgeries or cuts? No   9. Have you ever had problems with anemia or been told to take iron pills? No   10. Have you had any abnormal blood loss such as black, tarry or bloody stools? No   11. Have you ever had a blood transfusion? No   12. Are you willing to have a blood transfusion if it is medically needed before, during, or after your surgery? Yes   13. Have you or any of your relatives ever had problems with anesthesia? No   14. Do you have sleep apnea, excessive snoring or daytime drowsiness? No   15. Do you have any artifical heart valves or other implanted medical devices like a pacemaker, defibrillator, or continuous glucose monitor? No   16. Do you have artificial joints? No   17. Are you allergic to latex? No       Health Care Directive:  Patient does not have a Health Care Directive or Living Will: Patient states has Advance Directive and will bring in a copy to clinic.    Preoperative Review of :   reviewed - no record of controlled substances prescribed.          Review of Systems  CONSTITUTIONAL: NEGATIVE for fever, chills, change in weight  ENT/MOUTH: NEGATIVE for ear, mouth and throat problems  RESP: NEGATIVE for significant cough or SOB  CV: NEGATIVE for chest pain, palpitations or peripheral edema    Patient Active Problem List    Diagnosis Date Noted    Obesity (BMI 35.0-39.9) with comorbidity (H) 12/07/2020     Priority: Medium    Essential hypertension 05/22/2020     Priority: Medium  "   Mixed hyperlipidemia 02/10/2020     Priority: Medium    Diverticulosis of colon without diverticulitis 07/05/2016     Priority: Medium     Noted on colonoscopy done at [facility] 4/29/2016 - repeat in 5 years.           Past Medical History:   Diagnosis Date    Hypertension      Past Surgical History:   Procedure Laterality Date    APPENDECTOMY      ARTHROSCOPY ANKLE Right 12/15/2016    Procedure: ANKLE ARTHROSCOPY;  Surgeon: Donny Hoyt DPM;  Location: [facility] OR;  Service:     ARTHROTOMY ANKLE Right 12/15/2016    Procedure: OPEN ARTHROTOMY, LATERAL ANKLE STABLIZATION WITH TENDON GRAFT, RIGHT ANKLE;  Surgeon: Donny Hoyt DPM;  Location: [facility] OR;  Service:     HERNIA REPAIR      OTHER SURGICAL HISTORY      OTHER SURGICAL HISTORYtelescopic bowel age 5     Current Outpatient Medications   Medication Sig Dispense Refill    amLODIPine (NORVASC) 10 MG tablet Take 1 tablet (10 mg) by mouth daily 90 tablet 3       No Known Allergies     Social History     Tobacco Use    Smoking status: Never     Passive exposure: Never    Smokeless tobacco: Never   Substance Use Topics    Alcohol use: Yes     Alcohol/week: 1.0 - 2.0 standard drink of alcohol     Comment: 1-2 / week     Family History   Problem Relation Age of Onset    Heart Disease Mother     Memory loss Mother     Heart Disease Father     Early Death Father     No Known Problems Sister     No Known Problems Son     Colon Cancer Maternal Grandmother     Colon Cancer Paternal Grandfather      History   Drug Use No         Objective     BP (!) 152/102 (BP Location: Right arm, Patient Position: Sitting, Cuff Size: Adult Large)   Pulse 74   Temp 97.4  F (36.3  C) (Tympanic)   Resp 16   Ht 1.715 m (5' 7.5\")   Wt 98.6 kg (217 lb 4.8 oz)   SpO2 96%   BMI 33.53 kg/m      Physical Exam  GENERAL APPEARANCE: healthy, alert and no distress  HENT: ear canals and TM's normal and nose and mouth without ulcers or lesions  RESP: lungs clear to auscultation - no " rales, rhonchi or wheezes  CV: regular rate and rhythm, normal S1 S2, no S3 or S4 and no murmur, click or rub   ABDOMEN: soft, nontender, no HSM or masses and bowel sounds normal  NEURO: Normal strength and tone, sensory exam grossly normal, mentation intact and speech normal    Recent Labs   Lab Test 04/04/23  0835 02/22/22  1216   HGB 17.2  --      --     140   POTASSIUM 4.3 4.6   CR 1.22* 1.27   A1C 5.9*  --         Diagnostics:  No labs were ordered during this visit.   No EKG required, no history of coronary heart disease, significant arrhythmia, peripheral arterial disease or other structural heart disease.    Revised Cardiac Risk Index (RCRI):  The patient has the following serious cardiovascular risks for perioperative complications:   - No serious cardiac risks = 0 points     RCRI Interpretation: 0 points: Class I (very low risk - 0.4% complication rate)         Signed Electronically by: JOSELUIS Campos CNP  Copy of this evaluation report is provided to requesting physician.

## 2023-09-18 NOTE — PROGRESS NOTES
[address] [address]  Phone: [phone]  Fax: [phone]  Primary Provider: Mal Ariza  Pre-op Performing Provider: TAD LAYTON      PREOPERATIVE EVALUATION:  Today's date: 9/18/2023    Toni Mcclelland is a 58 year old male who presents for a preoperative evaluation.      9/18/2023     8:39 AM   Additional Questions   Roomed by JESSICA Alexander       Surgical Information:  Surgery/Procedure: Right Elbow Arthroscopy with removal loose bodies, synovectomy and debridement   Surgery Location: [facility]  Surgeon: Paulo Weiss MD  Surgery Date: 09/26/2023  Time of Surgery: 1:00 pm   Where patient plans to recover: At home with family  Fax number for surgical facility: Note does not need to be faxed, will be available electronically in Epic.    Assessment & Plan     The proposed surgical procedure is considered INTERMEDIATE risk.    Right elbow pain  Patient in for preop for right elbow arthroscopy with removal of loose bodies, synovectomy and debridement.  No abnormal findings on general physical exam.  Patient exercises regularly playing hockey.     - No identified additional risk factors other than previously addressed    Antiplatelet or Anticoagulation Medication Instructions:   - Patient is on no antiplatelet or anticoagulation medications.    Additional Medication Instructions:  Patient is to take all scheduled medications on the day of surgery    RECOMMENDATION:  APPROVAL GIVEN to proceed with proposed procedure, without further diagnostic evaluation.            Subjective       HPI related to upcoming procedure: Right elbow pain for past 4 months. Has cartilidge loose in joint space causing pain, as well as bone spurs.         9/18/2023     7:31 AM   Preop Questions   1. Have you ever had a heart attack or stroke? No   2. Have you ever had surgery on your heart or blood vessels, such as a stent placement, a  coronary artery bypass, or surgery on an artery in your head, neck, heart, or legs? No   3. Do you have chest pain with activity? No   4. Do you have a history of  heart failure? No   5. Do you currently have a cold, bronchitis or symptoms of other infection? No   6. Do you have a cough, shortness of breath, or wheezing? No   7. Do you or anyone in your family have previous history of blood clots? No   8. Do you or does anyone in your family have a serious bleeding problem such as prolonged bleeding following surgeries or cuts? No   9. Have you ever had problems with anemia or been told to take iron pills? No   10. Have you had any abnormal blood loss such as black, tarry or bloody stools? No   11. Have you ever had a blood transfusion? No   12. Are you willing to have a blood transfusion if it is medically needed before, during, or after your surgery? Yes   13. Have you or any of your relatives ever had problems with anesthesia? No   14. Do you have sleep apnea, excessive snoring or daytime drowsiness? No   15. Do you have any artifical heart valves or other implanted medical devices like a pacemaker, defibrillator, or continuous glucose monitor? No   16. Do you have artificial joints? No   17. Are you allergic to latex? No       Health Care Directive:  Patient does not have a Health Care Directive or Living Will: Patient states has Advance Directive and will bring in a copy to clinic.    Preoperative Review of :   reviewed - no record of controlled substances prescribed.          Review of Systems  CONSTITUTIONAL: NEGATIVE for fever, chills, change in weight  ENT/MOUTH: NEGATIVE for ear, mouth and throat problems  RESP: NEGATIVE for significant cough or SOB  CV: NEGATIVE for chest pain, palpitations or peripheral edema    Patient Active Problem List    Diagnosis Date Noted    Obesity (BMI 35.0-39.9) with comorbidity (H) 12/07/2020     Priority: Medium    Essential hypertension 05/22/2020     Priority: Medium  "   Mixed hyperlipidemia 02/10/2020     Priority: Medium    Diverticulosis of colon without diverticulitis 07/05/2016     Priority: Medium     Noted on colonoscopy done at [facility] 4/29/2016 - repeat in 5 years.           Past Medical History:   Diagnosis Date    Hypertension      Past Surgical History:   Procedure Laterality Date    APPENDECTOMY      ARTHROSCOPY ANKLE Right 12/15/2016    Procedure: ANKLE ARTHROSCOPY;  Surgeon: Donny Hoyt DPM;  Location: [facility] OR;  Service:     ARTHROTOMY ANKLE Right 12/15/2016    Procedure: OPEN ARTHROTOMY, LATERAL ANKLE STABLIZATION WITH TENDON GRAFT, RIGHT ANKLE;  Surgeon: Donny Hoyt DPM;  Location: [facility] OR;  Service:     HERNIA REPAIR      OTHER SURGICAL HISTORY      OTHER SURGICAL HISTORYtelescopic bowel age 5     Current Outpatient Medications   Medication Sig Dispense Refill    amLODIPine (NORVASC) 10 MG tablet Take 1 tablet (10 mg) by mouth daily 90 tablet 3       No Known Allergies     Social History     Tobacco Use    Smoking status: Never     Passive exposure: Never    Smokeless tobacco: Never   Substance Use Topics    Alcohol use: Yes     Alcohol/week: 1.0 - 2.0 standard drink of alcohol     Comment: 1-2 / week     Family History   Problem Relation Age of Onset    Heart Disease Mother     Memory loss Mother     Heart Disease Father     Early Death Father     No Known Problems Sister     No Known Problems Son     Colon Cancer Maternal Grandmother     Colon Cancer Paternal Grandfather      History   Drug Use No         Objective     BP (!) 152/102 (BP Location: Right arm, Patient Position: Sitting, Cuff Size: Adult Large)   Pulse 74   Temp 97.4  F (36.3  C) (Tympanic)   Resp 16   Ht 1.715 m (5' 7.5\")   Wt 98.6 kg (217 lb 4.8 oz)   SpO2 96%   BMI 33.53 kg/m      Physical Exam  GENERAL APPEARANCE: healthy, alert and no distress  HENT: ear canals and TM's normal and nose and mouth without ulcers or lesions  RESP: lungs clear to auscultation - no " rales, rhonchi or wheezes  CV: regular rate and rhythm, normal S1 S2, no S3 or S4 and no murmur, click or rub   ABDOMEN: soft, nontender, no HSM or masses and bowel sounds normal  NEURO: Normal strength and tone, sensory exam grossly normal, mentation intact and speech normal    Recent Labs   Lab Test 04/04/23  0835 02/22/22  1216   HGB 17.2  --      --     140   POTASSIUM 4.3 4.6   CR 1.22* 1.27   A1C 5.9*  --         Diagnostics:  No labs were ordered during this visit.   No EKG required, no history of coronary heart disease, significant arrhythmia, peripheral arterial disease or other structural heart disease.    Revised Cardiac Risk Index (RCRI):  The patient has the following serious cardiovascular risks for perioperative complications:   - No serious cardiac risks = 0 points     RCRI Interpretation: 0 points: Class I (very low risk - 0.4% complication rate)         Signed Electronically by: JOSELUIS Campos CNP  Copy of this evaluation report is provided to requesting physician.

## 2023-09-25 ENCOUNTER — ANESTHESIA EVENT (OUTPATIENT)
Dept: SURGERY | Facility: CLINIC | Age: 58
End: 2023-09-25
Payer: COMMERCIAL

## 2023-09-25 NOTE — ANESTHESIA PREPROCEDURE EVALUATION
Anesthesia Pre-Procedure Evaluation    Patient: Toni Mcclelland   MRN: [id number] : 1965        Procedure : Procedure(s):  Elbow arthroscopy with removal loose bodies, synovectomy and debridement          Past Medical History:   Diagnosis Date     Hypertension       Past Surgical History:   Procedure Laterality Date     APPENDECTOMY       ARTHROSCOPY ANKLE Right 12/15/2016    Procedure: ANKLE ARTHROSCOPY;  Surgeon: Donny Hoyt DPM;  Location: [facility] OR;  Service:      ARTHROTOMY ANKLE Right 12/15/2016    Procedure: OPEN ARTHROTOMY, LATERAL ANKLE STABLIZATION WITH TENDON GRAFT, RIGHT ANKLE;  Surgeon: Donny Hoyt DPM;  Location: [facility] OR;  Service:      HERNIA REPAIR       OTHER SURGICAL HISTORY      OTHER SURGICAL HISTORYtelescopic bowel age 5      No Known Allergies   Social History     Tobacco Use     Smoking status: Never     Passive exposure: Never     Smokeless tobacco: Never   Substance Use Topics     Alcohol use: Yes     Alcohol/week: 1.0 - 2.0 standard drink of alcohol     Comment: 1-2 / week      Wt Readings from Last 1 Encounters:   23 98.6 kg (217 lb 4.8 oz)        Anesthesia Evaluation   Pt has had prior anesthetic. Type: General, MAC and Regional.        ROS/MED HX  ENT/Pulmonary:  - neg pulmonary ROS     Neurologic:  - neg neurologic ROS     Cardiovascular:     (+) Dyslipidemia hypertension- -   -  - -                                      METS/Exercise Tolerance:     Hematologic:  - neg hematologic  ROS     Musculoskeletal:   (+)  arthritis,             GI/Hepatic:     (+)       Inflammatory bowel disease,             Renal/Genitourinary:  - neg Renal ROS     Endo:     (+)               Obesity,       Psychiatric/Substance Use:       Infectious Disease:  - neg infectious disease ROS     Malignancy:  - neg malignancy ROS     Other:  - neg other ROS          Physical Exam    Airway        Mallampati: II   TM distance: > 3 FB   Neck ROM: full   Mouth opening: > 3  cm    Respiratory Devices and Support         Dental           Cardiovascular   cardiovascular exam normal          Pulmonary   pulmonary exam normal            OUTSIDE LABS:  CBC:   Lab Results   Component Value Date    WBC 8.0 04/04/2023    HGB 17.2 04/04/2023    HCT 48.9 04/04/2023     04/04/2023     BMP:   Lab Results   Component Value Date     04/04/2023     02/22/2022    POTASSIUM 4.3 04/04/2023    POTASSIUM 4.6 02/22/2022    CHLORIDE 103 04/04/2023    CHLORIDE 104 02/22/2022    CO2 23 04/04/2023    CO2 25 02/22/2022    BUN 15.1 04/04/2023    BUN 12 02/22/2022    CR 1.22 (H) 04/04/2023    CR 1.27 02/22/2022     (H) 04/04/2023    GLC 93 02/22/2022     COAGS: No results found for: PTT, INR, FIBR  POC: No results found for: BGM, HCG, HCGS  HEPATIC:   Lab Results   Component Value Date    ALBUMIN 4.9 04/04/2023    PROTTOTAL 7.7 04/04/2023    ALT 17 04/04/2023    AST 23 04/04/2023    ALKPHOS 72 04/04/2023    BILITOTAL 0.9 04/04/2023     OTHER:   Lab Results   Component Value Date    A1C 5.9 (H) 04/04/2023    KEVIN 9.9 04/04/2023       Anesthesia Plan    ASA Status:  2       Anesthesia Type: General.   Induction: Propofol.   Maintenance: TIVA.        Consents    Anesthesia Plan(s) and associated risks, benefits, and realistic alternatives discussed. Questions answered and patient/representative(s) expressed understanding.     - Discussed: Risks, Benefits and Alternatives for BOTH SEDATION and the PROCEDURE were discussed     - Discussed with:  Patient            Postoperative Care    Pain management: IV analgesics, Oral pain medications, Peripheral nerve block (Single Shot), Multi-modal analgesia.   PONV prophylaxis: Ondansetron (or other 5HT-3), Dexamethasone or Solumedrol     Comments:              JOSELUIS Lopez CRNA

## 2023-09-26 ENCOUNTER — HOSPITAL ENCOUNTER (OUTPATIENT)
Facility: CLINIC | Age: 58
Discharge: HOME | End: 2023-09-26
Attending: ORTHOPAEDIC SURGERY | Admitting: ORTHOPAEDIC SURGERY
Payer: COMMERCIAL

## 2023-09-26 ENCOUNTER — ANESTHESIA (OUTPATIENT)
Dept: SURGERY | Facility: CLINIC | Age: 58
End: 2023-09-26
Payer: COMMERCIAL

## 2023-09-26 VITALS
RESPIRATION RATE: 16 BRPM | HEIGHT: 68 IN | DIASTOLIC BLOOD PRESSURE: 73 MMHG | OXYGEN SATURATION: 95 % | TEMPERATURE: 98.1 F | HEART RATE: 85 BPM | SYSTOLIC BLOOD PRESSURE: 118 MMHG | WEIGHT: 217 LBS | BODY MASS INDEX: 32.89 KG/M2

## 2023-09-26 DIAGNOSIS — M25.521 ELBOW PAIN, RIGHT: Primary | ICD-10-CM

## 2023-09-26 PROCEDURE — 250N000011 HC RX IP 250 OP 636: Performed by: NURSE ANESTHETIST, CERTIFIED REGISTERED

## 2023-09-26 PROCEDURE — 999N000141 HC STATISTIC PRE-PROCEDURE NURSING ASSESSMENT: Performed by: ORTHOPAEDIC SURGERY

## 2023-09-26 PROCEDURE — 250N000025 HC SEVOFLURANE, PER MIN: Performed by: ORTHOPAEDIC SURGERY

## 2023-09-26 PROCEDURE — 710N000012 HC RECOVERY PHASE 2, PER MINUTE: Performed by: ORTHOPAEDIC SURGERY

## 2023-09-26 PROCEDURE — 710N000009 HC RECOVERY PHASE 1, LEVEL 1, PER MIN: Performed by: ORTHOPAEDIC SURGERY

## 2023-09-26 PROCEDURE — 250N000009 HC RX 250: Performed by: ORTHOPAEDIC SURGERY

## 2023-09-26 PROCEDURE — 370N000017 HC ANESTHESIA TECHNICAL FEE, PER MIN: Performed by: ORTHOPAEDIC SURGERY

## 2023-09-26 PROCEDURE — 258N000003 HC RX IP 258 OP 636

## 2023-09-26 PROCEDURE — 250N000011 HC RX IP 250 OP 636: Performed by: PHYSICIAN ASSISTANT

## 2023-09-26 PROCEDURE — 88305 TISSUE EXAM BY PATHOLOGIST: CPT | Mod: TC | Performed by: ORTHOPAEDIC SURGERY

## 2023-09-26 PROCEDURE — 250N000009 HC RX 250: Performed by: NURSE ANESTHETIST, CERTIFIED REGISTERED

## 2023-09-26 PROCEDURE — 272N000001 HC OR GENERAL SUPPLY STERILE: Performed by: ORTHOPAEDIC SURGERY

## 2023-09-26 PROCEDURE — 250N000009 HC RX 250

## 2023-09-26 PROCEDURE — 360N000076 HC SURGERY LEVEL 3, PER MIN: Performed by: ORTHOPAEDIC SURGERY

## 2023-09-26 PROCEDURE — 250N000013 HC RX MED GY IP 250 OP 250 PS 637: Performed by: PHYSICIAN ASSISTANT

## 2023-09-26 RX ORDER — FENTANYL CITRATE 50 UG/ML
25 INJECTION, SOLUTION INTRAMUSCULAR; INTRAVENOUS
Status: DISCONTINUED | OUTPATIENT
Start: 2023-09-26 | End: 2023-09-26 | Stop reason: HOSPADM

## 2023-09-26 RX ORDER — GABAPENTIN 300 MG/1
300 CAPSULE ORAL
Status: COMPLETED | OUTPATIENT
Start: 2023-09-26 | End: 2023-09-26

## 2023-09-26 RX ORDER — FENTANYL CITRATE 50 UG/ML
50 INJECTION, SOLUTION INTRAMUSCULAR; INTRAVENOUS EVERY 5 MIN PRN
Status: DISCONTINUED | OUTPATIENT
Start: 2023-09-26 | End: 2023-09-26 | Stop reason: HOSPADM

## 2023-09-26 RX ORDER — LIDOCAINE 40 MG/G
CREAM TOPICAL
Status: DISCONTINUED | OUTPATIENT
Start: 2023-09-26 | End: 2023-09-26 | Stop reason: HOSPADM

## 2023-09-26 RX ORDER — ACETAMINOPHEN 325 MG/1
975 TABLET ORAL ONCE
Status: DISCONTINUED | OUTPATIENT
Start: 2023-09-26 | End: 2023-09-26

## 2023-09-26 RX ORDER — HYDROXYZINE HYDROCHLORIDE 25 MG/1
25 TABLET, FILM COATED ORAL EVERY 6 HOURS PRN
Status: DISCONTINUED | OUTPATIENT
Start: 2023-09-26 | End: 2023-09-26 | Stop reason: HOSPADM

## 2023-09-26 RX ORDER — GLYCOPYRROLATE 0.2 MG/ML
INJECTION, SOLUTION INTRAMUSCULAR; INTRAVENOUS PRN
Status: DISCONTINUED | OUTPATIENT
Start: 2023-09-26 | End: 2023-09-26

## 2023-09-26 RX ORDER — ONDANSETRON 4 MG/1
4 TABLET, ORALLY DISINTEGRATING ORAL EVERY 30 MIN PRN
Status: DISCONTINUED | OUTPATIENT
Start: 2023-09-26 | End: 2023-09-26 | Stop reason: HOSPADM

## 2023-09-26 RX ORDER — SODIUM CHLORIDE, SODIUM LACTATE, POTASSIUM CHLORIDE, CALCIUM CHLORIDE 600; 310; 30; 20 MG/100ML; MG/100ML; MG/100ML; MG/100ML
INJECTION, SOLUTION INTRAVENOUS CONTINUOUS
Status: DISCONTINUED | OUTPATIENT
Start: 2023-09-26 | End: 2023-09-26 | Stop reason: HOSPADM

## 2023-09-26 RX ORDER — FENTANYL CITRATE 50 UG/ML
INJECTION, SOLUTION INTRAMUSCULAR; INTRAVENOUS PRN
Status: DISCONTINUED | OUTPATIENT
Start: 2023-09-26 | End: 2023-09-26

## 2023-09-26 RX ORDER — HYDROCODONE BITARTRATE AND ACETAMINOPHEN 5; 325 MG/1; MG/1
1-2 TABLET ORAL EVERY 4 HOURS PRN
Qty: 25 TABLET | Refills: 0 | Status: SHIPPED | OUTPATIENT
Start: 2023-09-26 | End: 2024-04-03

## 2023-09-26 RX ORDER — EPHEDRINE SULFATE 50 MG/ML
INJECTION, SOLUTION INTRAMUSCULAR; INTRAVENOUS; SUBCUTANEOUS PRN
Status: DISCONTINUED | OUTPATIENT
Start: 2023-09-26 | End: 2023-09-26

## 2023-09-26 RX ORDER — OXYCODONE HYDROCHLORIDE 5 MG/1
5 TABLET ORAL
Status: DISCONTINUED | OUTPATIENT
Start: 2023-09-26 | End: 2023-09-26 | Stop reason: HOSPADM

## 2023-09-26 RX ORDER — ONDANSETRON 2 MG/ML
4 INJECTION INTRAMUSCULAR; INTRAVENOUS EVERY 30 MIN PRN
Status: DISCONTINUED | OUTPATIENT
Start: 2023-09-26 | End: 2023-09-26 | Stop reason: HOSPADM

## 2023-09-26 RX ORDER — LIDOCAINE HYDROCHLORIDE 20 MG/ML
INJECTION, SOLUTION INFILTRATION; PERINEURAL PRN
Status: DISCONTINUED | OUTPATIENT
Start: 2023-09-26 | End: 2023-09-26

## 2023-09-26 RX ORDER — ONDANSETRON 4 MG/1
4 TABLET, ORALLY DISINTEGRATING ORAL
Status: DISCONTINUED | OUTPATIENT
Start: 2023-09-26 | End: 2023-09-26 | Stop reason: HOSPADM

## 2023-09-26 RX ORDER — PROPOFOL 10 MG/ML
INJECTION, EMULSION INTRAVENOUS PRN
Status: DISCONTINUED | OUTPATIENT
Start: 2023-09-26 | End: 2023-09-26

## 2023-09-26 RX ORDER — HYDROMORPHONE HCL IN WATER/PF 6 MG/30 ML
0.4 PATIENT CONTROLLED ANALGESIA SYRINGE INTRAVENOUS EVERY 5 MIN PRN
Status: DISCONTINUED | OUTPATIENT
Start: 2023-09-26 | End: 2023-09-26 | Stop reason: HOSPADM

## 2023-09-26 RX ORDER — CEFAZOLIN SODIUM/WATER 2 G/20 ML
2 SYRINGE (ML) INTRAVENOUS
Status: COMPLETED | OUTPATIENT
Start: 2023-09-26 | End: 2023-09-26

## 2023-09-26 RX ORDER — DEXAMETHASONE SODIUM PHOSPHATE 4 MG/ML
INJECTION, SOLUTION INTRA-ARTICULAR; INTRALESIONAL; INTRAMUSCULAR; INTRAVENOUS; SOFT TISSUE PRN
Status: DISCONTINUED | OUTPATIENT
Start: 2023-09-26 | End: 2023-09-26

## 2023-09-26 RX ORDER — KETOROLAC TROMETHAMINE 30 MG/ML
INJECTION, SOLUTION INTRAMUSCULAR; INTRAVENOUS PRN
Status: DISCONTINUED | OUTPATIENT
Start: 2023-09-26 | End: 2023-09-26

## 2023-09-26 RX ORDER — HYDROXYZINE HYDROCHLORIDE 50 MG/1
50 TABLET, FILM COATED ORAL EVERY 6 HOURS PRN
Status: DISCONTINUED | OUTPATIENT
Start: 2023-09-26 | End: 2023-09-26 | Stop reason: HOSPADM

## 2023-09-26 RX ORDER — ACETAMINOPHEN 325 MG/1
975 TABLET ORAL ONCE
Status: COMPLETED | OUTPATIENT
Start: 2023-09-26 | End: 2023-09-26

## 2023-09-26 RX ORDER — CEFAZOLIN SODIUM/WATER 2 G/20 ML
2 SYRINGE (ML) INTRAVENOUS SEE ADMIN INSTRUCTIONS
Status: DISCONTINUED | OUTPATIENT
Start: 2023-09-26 | End: 2023-09-26 | Stop reason: HOSPADM

## 2023-09-26 RX ORDER — HYDROXYZINE HYDROCHLORIDE 25 MG/1
25 TABLET, FILM COATED ORAL EVERY 6 HOURS PRN
Qty: 25 TABLET | Refills: 0 | Status: SHIPPED | OUTPATIENT
Start: 2023-09-26 | End: 2024-04-03

## 2023-09-26 RX ORDER — ONDANSETRON 4 MG/1
4 TABLET, ORALLY DISINTEGRATING ORAL EVERY 8 HOURS PRN
Qty: 4 TABLET | Refills: 0 | Status: SHIPPED | OUTPATIENT
Start: 2023-09-26 | End: 2024-04-03

## 2023-09-26 RX ORDER — HYDROMORPHONE HCL IN WATER/PF 6 MG/30 ML
0.2 PATIENT CONTROLLED ANALGESIA SYRINGE INTRAVENOUS EVERY 5 MIN PRN
Status: DISCONTINUED | OUTPATIENT
Start: 2023-09-26 | End: 2023-09-26 | Stop reason: HOSPADM

## 2023-09-26 RX ORDER — ACETAMINOPHEN 325 MG/1
650 TABLET ORAL
Status: DISCONTINUED | OUTPATIENT
Start: 2023-09-26 | End: 2023-09-26 | Stop reason: HOSPADM

## 2023-09-26 RX ORDER — ONDANSETRON 2 MG/ML
INJECTION INTRAMUSCULAR; INTRAVENOUS PRN
Status: DISCONTINUED | OUTPATIENT
Start: 2023-09-26 | End: 2023-09-26

## 2023-09-26 RX ORDER — HYDROCODONE BITARTRATE AND ACETAMINOPHEN 5; 325 MG/1; MG/1
1 TABLET ORAL
Status: COMPLETED | OUTPATIENT
Start: 2023-09-26 | End: 2023-09-26

## 2023-09-26 RX ORDER — GABAPENTIN 300 MG/1
300 CAPSULE ORAL
Status: DISCONTINUED | OUTPATIENT
Start: 2023-09-26 | End: 2023-09-26 | Stop reason: HOSPADM

## 2023-09-26 RX ORDER — MAGNESIUM SULFATE HEPTAHYDRATE 40 MG/ML
INJECTION, SOLUTION INTRAVENOUS PRN
Status: DISCONTINUED | OUTPATIENT
Start: 2023-09-26 | End: 2023-09-26

## 2023-09-26 RX ORDER — HYDROXYZINE HYDROCHLORIDE 25 MG/1
25 TABLET, FILM COATED ORAL
Status: DISCONTINUED | OUTPATIENT
Start: 2023-09-26 | End: 2023-09-26 | Stop reason: HOSPADM

## 2023-09-26 RX ORDER — LIDOCAINE HYDROCHLORIDE AND EPINEPHRINE 10; 10 MG/ML; UG/ML
INJECTION, SOLUTION INFILTRATION; PERINEURAL PRN
Status: DISCONTINUED | OUTPATIENT
Start: 2023-09-26 | End: 2023-09-26 | Stop reason: HOSPADM

## 2023-09-26 RX ORDER — FENTANYL CITRATE 50 UG/ML
25 INJECTION, SOLUTION INTRAMUSCULAR; INTRAVENOUS EVERY 5 MIN PRN
Status: DISCONTINUED | OUTPATIENT
Start: 2023-09-26 | End: 2023-09-26 | Stop reason: HOSPADM

## 2023-09-26 RX ADMIN — Medication 5 MG: at 14:09

## 2023-09-26 RX ADMIN — DEXAMETHASONE SODIUM PHOSPHATE 10 MG: 4 INJECTION, SOLUTION INTRA-ARTICULAR; INTRALESIONAL; INTRAMUSCULAR; INTRAVENOUS; SOFT TISSUE at 13:27

## 2023-09-26 RX ADMIN — GLYCOPYRROLATE 0.1 MG: 0.2 INJECTION, SOLUTION INTRAMUSCULAR; INTRAVENOUS at 13:27

## 2023-09-26 RX ADMIN — ACETAMINOPHEN 975 MG: 325 TABLET, FILM COATED ORAL at 11:30

## 2023-09-26 RX ADMIN — LIDOCAINE HYDROCHLORIDE 0.1 ML: 10 INJECTION, SOLUTION EPIDURAL; INFILTRATION; INTRACAUDAL; PERINEURAL at 11:44

## 2023-09-26 RX ADMIN — KETOROLAC TROMETHAMINE 15 MG: 30 INJECTION, SOLUTION INTRAMUSCULAR at 13:48

## 2023-09-26 RX ADMIN — ROCURONIUM BROMIDE 40 MG: 50 INJECTION, SOLUTION INTRAVENOUS at 13:27

## 2023-09-26 RX ADMIN — HYDROCODONE BITARTRATE AND ACETAMINOPHEN 1 TABLET: 5; 325 TABLET ORAL at 16:14

## 2023-09-26 RX ADMIN — ONDANSETRON 4 MG: 2 INJECTION INTRAMUSCULAR; INTRAVENOUS at 14:47

## 2023-09-26 RX ADMIN — MIDAZOLAM 2 MG: 1 INJECTION INTRAMUSCULAR; INTRAVENOUS at 13:21

## 2023-09-26 RX ADMIN — LIDOCAINE HYDROCHLORIDE 100 MG: 20 INJECTION, SOLUTION INFILTRATION; PERINEURAL at 13:27

## 2023-09-26 RX ADMIN — Medication 5 MG: at 13:54

## 2023-09-26 RX ADMIN — Medication 5 MG: at 13:57

## 2023-09-26 RX ADMIN — FENTANYL CITRATE 100 MCG: 50 INJECTION, SOLUTION INTRAMUSCULAR; INTRAVENOUS at 13:27

## 2023-09-26 RX ADMIN — HYDROMORPHONE HYDROCHLORIDE 1 MG: 1 INJECTION, SOLUTION INTRAMUSCULAR; INTRAVENOUS; SUBCUTANEOUS at 14:02

## 2023-09-26 RX ADMIN — SODIUM CHLORIDE, POTASSIUM CHLORIDE, SODIUM LACTATE AND CALCIUM CHLORIDE 1000 ML: 600; 310; 30; 20 INJECTION, SOLUTION INTRAVENOUS at 11:43

## 2023-09-26 RX ADMIN — SUGAMMADEX 200 MG: 100 INJECTION, SOLUTION INTRAVENOUS at 14:47

## 2023-09-26 RX ADMIN — PROPOFOL 200 MG: 10 INJECTION, EMULSION INTRAVENOUS at 13:27

## 2023-09-26 RX ADMIN — MAGNESIUM SULFATE HEPTAHYDRATE 2 G: 40 INJECTION, SOLUTION INTRAVENOUS at 13:44

## 2023-09-26 RX ADMIN — GABAPENTIN 300 MG: 300 CAPSULE ORAL at 11:30

## 2023-09-26 RX ADMIN — Medication 2 G: at 13:17

## 2023-09-26 ASSESSMENT — ACTIVITIES OF DAILY LIVING (ADL)
ADLS_ACUITY_SCORE: 35
ADLS_ACUITY_SCORE: 33
ADLS_ACUITY_SCORE: 35

## 2023-09-26 NOTE — OP NOTE
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  Once there he was transferred over the operating table in supine position.  He was induced per anesthesia.  He was transferred up to the left lateral decubitus position.  An axillary roll was put in place and all pressure points were carefully padded and protected.  The right upper extremity was prepped and draped in usual sterile fashion.  The arm was exsanguinated with an Esmarch bandage and the tourniquet was put up to 250 mmHg.  25 cc of saline was infiltrated into the joint.  11 blade was utilized to make skin incisions only for portals.  Standard proximal anteromedial and proximal anterolateral portals were made along with a direct lateral portal.  We started with the scope medially.  There was some grade II chondromalacia of the radiocapitellar joint and synovitis laterally.  Synovectomy was performed.  There were a couple of small loose bodies at the anterior aspect of the proximal radial ulnar joint which were removed.  We then switched the scope laterally and found synovitis in the medial side of the joint as well which was debrided with a shaver.  There were some loose bodies as well as debris in the coronoid fossa which was debrided back to a stable edge.  Flexion of the elbow was about 135 degrees with no impingement.  Having completed this we examined the coronoid which did have a little bit of an anterior osteophyte although there was no impingement so we did not remove it.  Cartilage in the trochlea was relatively normal.    Attention was then turned to the posterior aspect of the joint.  A  posterior central and posterior lateral portals were initially made.  With the elbow at 45 degrees of extension we debrided out the olecranon fossa and found multiple loose bodies which were brought out one by one with a grasper.  There was no medial loose body/osteophyte on the coronoid which was removed.  Care was taken avoid injuring the ulnar nerve.  We assessed the olecranon fossa for impingement and found some osteophytes so high-speed arthroscopic bur was utilized to bur out the olecranon fossa to achieve better extension without impingement.  We then went into the posterolateral aspect of the joint.  We found osteophytes along the rim of the trochlea.  Again grade II chondromalacia of portions of the radial head and there were a few small portions of grade 3 almost grade IV chondromalacia but these areas encompassed no more than about 10% of the surface of the radial head.  Capitellum only had grade I and II chondromalacia.  No additional loose bodies were noted.  Scope was removed portals were closed with 3-0 nylon posterior mattress fashion.  The portals were closed were injected with local anesthetic anesthetic.  A dressing consisting of Adaptic gauze 4 x 4 fluffs sterile ABDs and discredit sterile Webril and an Ace bandage was then applied.  Tourniquet was let down for a total tourniquet time of 58 minutes.  The patient's fingers pinked up briskly.  He was awakened and transferred the transfer cart and taken to the recovery in stable condition and spontaneously.    The PA was medically necessary to ensure smooth and safe progression case.  The PA was present for the entire case from positioning prepping and draping through wound closure and application of the dressing.  The PA was critical for protection of the neurovascular structures and exposure especially of the anterior compartment while the surgeon was performing the case.

## 2023-09-26 NOTE — INTERVAL H&P NOTE
"I have reviewed the surgical (or preoperative) H&P that is linked to this encounter, and examined the patient. There are no significant changes    Clinical Conditions Present on Arrival:  Clinically Significant Risk Factors Present on Admission                  # Obesity: Estimated body mass index is 33.49 kg/m  as calculated from the following:    Height as of this encounter: 1.715 m (5' 7.5\").    Weight as of this encounter: 98.4 kg (217 lb).       "

## 2023-09-26 NOTE — ANESTHESIA POSTPROCEDURE EVALUATION
Patient: Toni Mcclelland    Procedure: Procedure(s):  Elbow arthroscopy with removal loose bodies, synovectomy and debridement       Anesthesia Type:  General    Note:  Disposition: Inpatient   Postop Pain Control: Uneventful            Sign Out: Well controlled pain   PONV: No   Neuro/Psych: Uneventful            Sign Out: Acceptable/Baseline neuro status   Airway/Respiratory: Uneventful            Sign Out: Acceptable/Baseline resp. status   CV/Hemodynamics: Uneventful            Sign Out: Acceptable CV status; No obvious hypovolemia; No obvious fluid overload   Other NRE: NONE   DID A NON-ROUTINE EVENT OCCUR? No       Last vitals:  Vitals Value Taken Time   /85 09/26/23 1530   Temp 36.6  C (97.8  F) 09/26/23 1530   Pulse 87 09/26/23 1535   Resp 15 09/26/23 1535   SpO2 91 % 09/26/23 1535   Vitals shown include unvalidated device data.    Electronically Signed By: JOSELUIS Estrada CRNA  September 26, 2023  4:18 PM  
reason for artificial airway has resolved

## 2023-09-26 NOTE — ANESTHESIA CARE TRANSFER NOTE
Patient: Toni Mcclelland    Procedure: Procedure(s):  Elbow arthroscopy with removal loose bodies, synovectomy and debridement       Diagnosis: Elbow pain, right [M25.521]  Diagnosis Additional Information: No value filed.    Anesthesia Type:   General     Note:    Oropharynx: oropharynx clear of all foreign objects  Level of Consciousness: drowsy  Oxygen Supplementation: face mask    Independent Airway: airway patency satisfactory and stable  Dentition: dentition unchanged  Vital Signs Stable: post-procedure vital signs reviewed and stable  Report to RN Given: handoff report given  Patient transferred to: PACU    Handoff Report: Identifed the Patient, Identified the Reponsible Provider, Reviewed the pertinent medical history, Discussed the surgical course, Reviewed Intra-OP anesthesia mangement and issues during anesthesia, Set expectations for post-procedure period and Allowed opportunity for questions and acknowledgement of understanding      Vitals:  Vitals Value Taken Time   /87 09/26/23 1504   Temp     Pulse 100 09/26/23 1504   Resp 51 09/26/23 1504   SpO2 98 % 09/26/23 1507   Vitals shown include unvalidated device data.    Electronically Signed By: JOSELUIS Garay CRNA  September 26, 2023  3:08 PM

## 2023-09-28 LAB
PATH REPORT.COMMENTS IMP SPEC: NORMAL
PATH REPORT.COMMENTS IMP SPEC: NORMAL
PATH REPORT.FINAL DX SPEC: NORMAL
PATH REPORT.GROSS SPEC: NORMAL
PATH REPORT.MICROSCOPIC SPEC OTHER STN: NORMAL
PATH REPORT.RELEVANT HX SPEC: NORMAL
PHOTO IMAGE: NORMAL

## 2023-09-28 PROCEDURE — 88304 TISSUE EXAM BY PATHOLOGIST: CPT | Mod: 26 | Performed by: PATHOLOGY

## 2023-10-04 ENCOUNTER — TRANSFERRED RECORDS (OUTPATIENT)
Dept: HEALTH INFORMATION MANAGEMENT | Facility: CLINIC | Age: 58
End: 2023-10-04
Payer: COMMERCIAL

## 2023-11-08 ENCOUNTER — TRANSFERRED RECORDS (OUTPATIENT)
Dept: HEALTH INFORMATION MANAGEMENT | Facility: CLINIC | Age: 58
End: 2023-11-08
Payer: COMMERCIAL

## 2023-12-21 ENCOUNTER — TRANSFERRED RECORDS (OUTPATIENT)
Dept: HEALTH INFORMATION MANAGEMENT | Facility: CLINIC | Age: 58
End: 2023-12-21
Payer: COMMERCIAL

## 2023-12-27 ENCOUNTER — TRANSFERRED RECORDS (OUTPATIENT)
Dept: HEALTH INFORMATION MANAGEMENT | Facility: CLINIC | Age: 58
End: 2023-12-27
Payer: COMMERCIAL

## 2023-12-28 ENCOUNTER — IMMUNIZATION (OUTPATIENT)
Dept: FAMILY MEDICINE | Facility: CLINIC | Age: 58
End: 2023-12-28
Payer: COMMERCIAL

## 2023-12-28 PROCEDURE — 91320 SARSCV2 VAC 30MCG TRS-SUC IM: CPT

## 2023-12-28 PROCEDURE — 90480 ADMN SARSCOV2 VAC 1/ONLY CMP: CPT

## 2024-03-13 DIAGNOSIS — I10 ESSENTIAL HYPERTENSION: ICD-10-CM

## 2024-03-13 RX ORDER — AMLODIPINE BESYLATE 10 MG/1
10 TABLET ORAL DAILY
Qty: 90 TABLET | Refills: 0 | Status: SHIPPED | OUTPATIENT
Start: 2024-03-13 | End: 2024-04-03

## 2024-03-29 DIAGNOSIS — E78.2 MIXED HYPERLIPIDEMIA: ICD-10-CM

## 2024-03-29 DIAGNOSIS — Z13.1 SCREENING FOR DIABETES MELLITUS: ICD-10-CM

## 2024-03-29 DIAGNOSIS — I10 ESSENTIAL HYPERTENSION: ICD-10-CM

## 2024-03-29 DIAGNOSIS — Z12.5 SCREENING FOR PROSTATE CANCER: ICD-10-CM

## 2024-03-29 DIAGNOSIS — Z00.00 ANNUAL PHYSICAL EXAM: Primary | ICD-10-CM

## 2024-04-03 ENCOUNTER — LAB (OUTPATIENT)
Dept: LAB | Facility: CLINIC | Age: 59
End: 2024-04-03
Payer: COMMERCIAL

## 2024-04-03 ENCOUNTER — OFFICE VISIT (OUTPATIENT)
Dept: FAMILY MEDICINE | Facility: CLINIC | Age: 59
End: 2024-04-03
Payer: COMMERCIAL

## 2024-04-03 VITALS
SYSTOLIC BLOOD PRESSURE: 134 MMHG | HEART RATE: 72 BPM | RESPIRATION RATE: 12 BRPM | DIASTOLIC BLOOD PRESSURE: 80 MMHG | HEIGHT: 67 IN | OXYGEN SATURATION: 97 % | TEMPERATURE: 98.7 F | WEIGHT: 215 LBS | BODY MASS INDEX: 33.74 KG/M2

## 2024-04-03 DIAGNOSIS — Z00.00 ANNUAL PHYSICAL EXAM: Primary | ICD-10-CM

## 2024-04-03 DIAGNOSIS — E66.811 CLASS 1 OBESITY DUE TO EXCESS CALORIES WITHOUT SERIOUS COMORBIDITY WITH BODY MASS INDEX (BMI) OF 33.0 TO 33.9 IN ADULT: ICD-10-CM

## 2024-04-03 DIAGNOSIS — I10 ESSENTIAL HYPERTENSION: ICD-10-CM

## 2024-04-03 DIAGNOSIS — Z00.00 ANNUAL PHYSICAL EXAM: ICD-10-CM

## 2024-04-03 DIAGNOSIS — Z13.1 SCREENING FOR DIABETES MELLITUS: ICD-10-CM

## 2024-04-03 DIAGNOSIS — Z12.5 SCREENING FOR PROSTATE CANCER: ICD-10-CM

## 2024-04-03 DIAGNOSIS — E66.09 CLASS 1 OBESITY DUE TO EXCESS CALORIES WITHOUT SERIOUS COMORBIDITY WITH BODY MASS INDEX (BMI) OF 33.0 TO 33.9 IN ADULT: ICD-10-CM

## 2024-04-03 DIAGNOSIS — E78.2 MIXED HYPERLIPIDEMIA: ICD-10-CM

## 2024-04-03 LAB
ALBUMIN SERPL BCG-MCNC: 4.8 G/DL (ref 3.5–5.2)
ALP SERPL-CCNC: 75 U/L (ref 40–150)
ALT SERPL W P-5'-P-CCNC: 15 U/L (ref 0–70)
ANION GAP SERPL CALCULATED.3IONS-SCNC: 13 MMOL/L (ref 7–15)
AST SERPL W P-5'-P-CCNC: 19 U/L (ref 0–45)
BILIRUB SERPL-MCNC: 1.1 MG/DL
BUN SERPL-MCNC: 13.2 MG/DL (ref 6–20)
CALCIUM SERPL-MCNC: 9.7 MG/DL (ref 8.6–10)
CHLORIDE SERPL-SCNC: 102 MMOL/L (ref 98–107)
CHOLEST SERPL-MCNC: 231 MG/DL
CREAT SERPL-MCNC: 1.23 MG/DL (ref 0.67–1.17)
DEPRECATED HCO3 PLAS-SCNC: 24 MMOL/L (ref 22–29)
EGFRCR SERPLBLD CKD-EPI 2021: 68 ML/MIN/1.73M2
ERYTHROCYTE [DISTWIDTH] IN BLOOD BY AUTOMATED COUNT: 12 % (ref 10–15)
FASTING STATUS PATIENT QL REPORTED: YES
GLUCOSE SERPL-MCNC: 94 MG/DL (ref 70–99)
HBA1C MFR BLD: 5.6 % (ref 0–5.6)
HCT VFR BLD AUTO: 49.2 % (ref 40–53)
HDLC SERPL-MCNC: 45 MG/DL
HGB BLD-MCNC: 17 G/DL (ref 13.3–17.7)
LDLC SERPL CALC-MCNC: 161 MG/DL
MCH RBC QN AUTO: 30.9 PG (ref 26.5–33)
MCHC RBC AUTO-ENTMCNC: 34.6 G/DL (ref 31.5–36.5)
MCV RBC AUTO: 89 FL (ref 78–100)
NONHDLC SERPL-MCNC: 186 MG/DL
PLATELET # BLD AUTO: 246 10E3/UL (ref 150–450)
POTASSIUM SERPL-SCNC: 4.4 MMOL/L (ref 3.4–5.3)
PROT SERPL-MCNC: 7.6 G/DL (ref 6.4–8.3)
PSA SERPL DL<=0.01 NG/ML-MCNC: 0.85 NG/ML (ref 0–3.5)
RBC # BLD AUTO: 5.51 10E6/UL (ref 4.4–5.9)
SODIUM SERPL-SCNC: 139 MMOL/L (ref 135–145)
TRIGL SERPL-MCNC: 126 MG/DL
WBC # BLD AUTO: 8.4 10E3/UL (ref 4–11)

## 2024-04-03 PROCEDURE — 83036 HEMOGLOBIN GLYCOSYLATED A1C: CPT

## 2024-04-03 PROCEDURE — 99214 OFFICE O/P EST MOD 30 MIN: CPT | Mod: 25 | Performed by: PHYSICIAN ASSISTANT

## 2024-04-03 PROCEDURE — 99396 PREV VISIT EST AGE 40-64: CPT | Performed by: PHYSICIAN ASSISTANT

## 2024-04-03 PROCEDURE — 85027 COMPLETE CBC AUTOMATED: CPT

## 2024-04-03 PROCEDURE — 80061 LIPID PANEL: CPT

## 2024-04-03 PROCEDURE — G0103 PSA SCREENING: HCPCS

## 2024-04-03 PROCEDURE — 36415 COLL VENOUS BLD VENIPUNCTURE: CPT

## 2024-04-03 PROCEDURE — 80053 COMPREHEN METABOLIC PANEL: CPT

## 2024-04-03 RX ORDER — ATORVASTATIN CALCIUM 20 MG/1
20 TABLET, FILM COATED ORAL DAILY
Qty: 90 TABLET | Refills: 3 | Status: SHIPPED | OUTPATIENT
Start: 2024-04-03

## 2024-04-03 RX ORDER — AMLODIPINE BESYLATE 10 MG/1
10 TABLET ORAL DAILY
Qty: 90 TABLET | Refills: 3 | Status: SHIPPED | OUTPATIENT
Start: 2024-04-03

## 2024-04-03 SDOH — HEALTH STABILITY: PHYSICAL HEALTH: ON AVERAGE, HOW MANY MINUTES DO YOU ENGAGE IN EXERCISE AT THIS LEVEL?: 40 MIN

## 2024-04-03 SDOH — HEALTH STABILITY: PHYSICAL HEALTH: ON AVERAGE, HOW MANY DAYS PER WEEK DO YOU ENGAGE IN MODERATE TO STRENUOUS EXERCISE (LIKE A BRISK WALK)?: 3 DAYS

## 2024-04-03 ASSESSMENT — ENCOUNTER SYMPTOMS
SORE THROAT: 0
SEIZURES: 0
CHILLS: 0
ARTHRALGIAS: 0
HEMATURIA: 0
EYE PAIN: 0
BACK PAIN: 0
COUGH: 0
FEVER: 0
VOMITING: 0
ABDOMINAL PAIN: 0
COLOR CHANGE: 0
PALPITATIONS: 0
SHORTNESS OF BREATH: 0
DYSURIA: 0

## 2024-04-03 ASSESSMENT — SOCIAL DETERMINANTS OF HEALTH (SDOH): HOW OFTEN DO YOU GET TOGETHER WITH FRIENDS OR RELATIVES?: THREE TIMES A WEEK

## 2024-04-03 NOTE — PROGRESS NOTES
Preventive Care Visit  [facility]  Mal Ariza PA-C, Family Medicine  Apr 3, 2024      Assessment & Plan     Annual physical exam  Labs were done prior to his appointment.  Overall doing well.  CBC complete metabolic panel, lipase, PSA and A1c were done today    Mixed hyperlipidemia  Lipid panel elevated today.  ASCVD score 11.5.  We did discuss diet and exercise.  It would also be reasonable to start him on Lipitor 20 mg daily to help prevent cardiac events and he is agreeable.  Will start Lipitor 20 mg daily.  Side effects of the medication discussed.  Will check lipid panel and LFTs in 3 months.  - atorvastatin (LIPITOR) 20 MG tablet; Take 1 tablet (20 mg) by mouth daily  - Lipid panel reflex to direct LDL Fasting; Future  - Hepatic panel (Albumin, ALT, AST, Bili, Alk Phos, TP); Future    Essential hypertension  Blood pressure well-controlled at 134/80.  Continue with amlodipine 10 mg daily  - amLODIPine (NORVASC) 10 MG tablet; Take 1 tablet (10 mg) by mouth daily    Screening for prostate cancer  PSA done today was and was within normal range    Screening for diabetes mellitus  A1c today was within normal range    Class I obesity due to excessive calories without severe comorbidities with body mass index of 33.0-33.9 and adult  BMI 33.35.  We did discuss diet and activity and he will be working on this      Patient has been advised of split billing requirements and indicates understanding: Yes     Follow-up Visit   Expected date:  Apr 03, 2025 (Approximate)      Follow Up Appointment Details:     Follow-up with whom?: Me    Follow-Up for what?: Adult Preventive    How?: In Person                     Current Outpatient Medications   Medication Sig Dispense Refill    amLODIPine (NORVASC) 10 MG tablet Take 1 tablet (10 mg) by mouth daily 90 tablet 3    atorvastatin (LIPITOR) 20 MG tablet Take 1 tablet (20 mg) by mouth daily 90 tablet 3     No current facility-administered  "medications for this visit.           BMI  Estimated body mass index is 33.35 kg/m  as calculated from the following:    Height as of this encounter: 1.71 m (5' 7.32\").    Weight as of this encounter: 97.5 kg (215 lb).   Weight management plan: Discussed healthy diet and exercise guidelines    Counseling  Appropriate preventive services were discussed with this patient, including applicable screening as appropriate for fall prevention, nutrition, physical activity, Tobacco-use cessation, weight loss and cognition.  Checklist reviewing preventive services available has been given to the patient.  Reviewed patient's diet, addressing concerns and/or questions.   He is at risk for lack of exercise and has been provided with information to increase physical activity for the benefit of his well-being.   He is at risk for psychosocial distress and has been provided with information to reduce risk.         Carol White is a 58 year old, presenting for the following:  Physical (Non fasting Physical with medication refills)        4/3/2024     4:05 PM   Additional Questions   Roomed by Claudia Kirby   Accompanied by none        Health Care Directive  Patient does not have a Health Care Directive or Living Will:     Is a pleasant 58-year-old male who presents to the clinic today for annual physical.  Past medical history significant for hypertension and hyperlipidemia.  No questions or concerns regarding his chronic health.  No concerns with anxiety or depression.  Overall feeling well          4/3/2024   General Health   How would you rate your overall physical health? Good   Feel stress (tense, anxious, or unable to sleep) Only a little   (!) STRESS CONCERN      4/3/2024   Nutrition   Three or more servings of calcium each day? (!) NO   Diet: Regular (no restrictions)   How many servings of fruit and vegetables per day? (!) 2-3   How many sweetened beverages each day? 0-1         4/3/2024   Exercise   Days per week of " moderate/strenous exercise 3 days   Average minutes spent exercising at this level 40 min         4/3/2024   Social Factors   Frequency of gathering with friends or relatives Three times a week   Worry food won't last until get money to buy more No   Food not last or not have enough money for food? No   Do you have housing?  Yes   Are you worried about losing your housing? No   Lack of transportation? No   Unable to get utilities (heat,electricity)? No          No data to display                   4/3/2024   Dental   Dentist two times every year? Yes         4/3/2024   TB Screening   Were you born outside of the [place]? No         Today's PHQ-2 Score:       4/3/2024     3:58 PM   PHQ-2 ( 1999 [employer])   Q1: Little interest or pleasure in doing things 0   Q2: Feeling down, depressed or hopeless 0   PHQ-2 Score 0   Q1: Little interest or pleasure in doing things Not at all   Q2: Feeling down, depressed or hopeless Not at all   PHQ-2 Score 0           4/3/2024   Substance Use   Alcohol more than 3/day or more than 7/wk No   Do you use any other substances recreationally? No     Social History     Tobacco Use    Smoking status: Never     Passive exposure: Never    Smokeless tobacco: Never   Vaping Use    Vaping Use: Never used   Substance Use Topics    Alcohol use: Yes     Alcohol/week: 1.0 - 2.0 standard drink of alcohol     Comment: 1-2 / week    Drug use: No           4/3/2024   STI Screening   New sexual partner(s) since last STI/HIV test? No   Last PSA:   Prostate Specific Antigen Screen   Date Value Ref Range Status   04/03/2024 0.85 0.00 - 3.50 ng/mL Final   02/22/2022 0.75 0.00 - 3.50 ug/L Final     ASCVD Risk   The 10-year ASCVD risk score (Kae WAGONER, et al., 2019) is: 11.5%    Values used to calculate the score:      Age: 58 years      Sex: Male      Is Non- : No      Diabetic: No      Tobacco smoker: No      Systolic Blood Pressure: 134 mmHg      Is BP treated: Yes      HDL  "Cholesterol: 45 mg/dL      Total Cholesterol: 231 mg/dL           Reviewed and updated as needed this visit by Provider                    Past Medical History:   Diagnosis Date    Hypertension      Past Surgical History:   Procedure Laterality Date    APPENDECTOMY      ARTHROSCOPY ANKLE Right 12/15/2016    Procedure: ANKLE ARTHROSCOPY;  Surgeon: Donny Hoyt DPM;  Location: [facility] OR;  Service:     ARTHROSCOPY ELBOW Right 9/26/2023    Procedure: Right Elbow arthroscopy with removal loose bodies,;  Surgeon: Paulo Weiss MD;  Location: [facility] OR    ARTHROTOMY ANKLE Right 12/15/2016    Procedure: OPEN ARTHROTOMY, LATERAL ANKLE STABLIZATION WITH TENDON GRAFT, RIGHT ANKLE;  Surgeon: Donny Hoyt DPM;  Location: [facility] OR;  Service:     HERNIA REPAIR      IRRIGATION AND DEBRIDEMENT UPPER EXTREMITY, COMBINED  9/26/2023    Procedure: Right elbow synovectomy and debridement;  Surgeon: Paulo Weiss MD;  Location: [facility] OR    OTHER SURGICAL HISTORY      OTHER SURGICAL HISTORYtelescopic bowel age 5       Review of Systems   Constitutional:  Negative for chills and fever.   HENT:  Negative for ear pain and sore throat.    Eyes:  Negative for pain and visual disturbance.   Respiratory:  Negative for cough and shortness of breath.    Cardiovascular:  Negative for chest pain and palpitations.   Gastrointestinal:  Negative for abdominal pain and vomiting.   Genitourinary:  Negative for dysuria and hematuria.   Musculoskeletal:  Negative for arthralgias and back pain.   Skin:  Negative for color change and rash.   Neurological:  Negative for seizures and syncope.   All other systems reviewed and are negative.         Objective    Exam  /80 (BP Location: Left arm, Patient Position: Sitting, Cuff Size: Adult Regular)   Pulse 72   Temp 98.7  F (37.1  C) (Oral)   Resp 12   Ht 1.71 m (5' 7.32\")   Wt 97.5 kg (215 lb)   SpO2 97%   BMI 33.35 kg/m     Estimated body mass index is 33.35 kg/m  as " "calculated from the following:    Height as of this encounter: 1.71 m (5' 7.32\").    Weight as of this encounter: 97.5 kg (215 lb).    Physical Exam  Vitals and nursing note reviewed.   Constitutional:       General: He is not in acute distress.     Appearance: Normal appearance. He is well-developed and well-groomed. He is not ill-appearing or toxic-appearing.   HENT:      Head: Normocephalic and atraumatic.      Right Ear: Tympanic membrane, ear canal and external ear normal.      Left Ear: Tympanic membrane, ear canal and external ear normal.      Mouth/Throat:      Lips: Pink.      Mouth: Mucous membranes are moist.      Palate: No mass.      Pharynx: Oropharynx is clear. Uvula midline.      Tonsils: No tonsillar exudate or tonsillar abscesses.   Eyes:      General: Lids are normal.         Right eye: No discharge.         Left eye: No discharge.   Neck:      Trachea: Trachea normal.   Cardiovascular:      Rate and Rhythm: Normal rate and regular rhythm.      Heart sounds: S1 normal and S2 normal. No murmur heard.  Pulmonary:      Effort: Pulmonary effort is normal.      Breath sounds: Normal breath sounds and air entry.   Abdominal:      General: Bowel sounds are normal. There is no distension.      Palpations: Abdomen is soft.      Tenderness: There is no abdominal tenderness. There is no guarding or rebound.   Musculoskeletal:      Cervical back: Full passive range of motion without pain and neck supple.      Right lower leg: No edema.      Left lower leg: No edema.   Lymphadenopathy:      Cervical: No cervical adenopathy.   Skin:     General: Skin is warm and dry.      Findings: No lesion or rash.   Neurological:      General: No focal deficit present.      Mental Status: He is alert.      Cranial Nerves: No cranial nerve deficit.      Gait: Gait is intact.      Deep Tendon Reflexes:      Reflex Scores:       Patellar reflexes are 2+ on the right side and 2+ on the left side.  Psychiatric:         Attention " and Perception: Attention normal.         Mood and Affect: Mood normal.         Speech: Speech normal.           Signed Electronically by: Mal Ariza PA-C

## 2024-10-23 ENCOUNTER — OFFICE VISIT (OUTPATIENT)
Dept: FAMILY MEDICINE | Facility: CLINIC | Age: 59
End: 2024-10-23
Payer: COMMERCIAL

## 2024-10-23 VITALS
HEIGHT: 67 IN | SYSTOLIC BLOOD PRESSURE: 134 MMHG | TEMPERATURE: 98.1 F | WEIGHT: 218 LBS | RESPIRATION RATE: 14 BRPM | BODY MASS INDEX: 34.21 KG/M2 | DIASTOLIC BLOOD PRESSURE: 82 MMHG | HEART RATE: 63 BPM | OXYGEN SATURATION: 96 %

## 2024-10-23 DIAGNOSIS — E78.2 MIXED HYPERLIPIDEMIA: ICD-10-CM

## 2024-10-23 DIAGNOSIS — I10 ESSENTIAL HYPERTENSION: ICD-10-CM

## 2024-10-23 DIAGNOSIS — Z01.818 PRE-OPERATIVE EXAMINATION: Primary | ICD-10-CM

## 2024-10-23 DIAGNOSIS — M19.071 ARTHRITIS OF RIGHT ANKLE: ICD-10-CM

## 2024-10-23 LAB
ALBUMIN SERPL BCG-MCNC: 4.5 G/DL (ref 3.5–5.2)
ALP SERPL-CCNC: 77 U/L (ref 40–150)
ALT SERPL W P-5'-P-CCNC: 25 U/L (ref 0–70)
ANION GAP SERPL CALCULATED.3IONS-SCNC: 10 MMOL/L (ref 7–15)
AST SERPL W P-5'-P-CCNC: 24 U/L (ref 0–45)
BILIRUB DIRECT SERPL-MCNC: 0.21 MG/DL (ref 0–0.3)
BILIRUB SERPL-MCNC: 0.9 MG/DL
BUN SERPL-MCNC: 11.4 MG/DL (ref 8–23)
CALCIUM SERPL-MCNC: 9.7 MG/DL (ref 8.8–10.4)
CHLORIDE SERPL-SCNC: 105 MMOL/L (ref 98–107)
CHOLEST SERPL-MCNC: 136 MG/DL
CREAT SERPL-MCNC: 1.21 MG/DL (ref 0.67–1.17)
EGFRCR SERPLBLD CKD-EPI 2021: 69 ML/MIN/1.73M2
ERYTHROCYTE [DISTWIDTH] IN BLOOD BY AUTOMATED COUNT: 11.8 % (ref 10–15)
FASTING STATUS PATIENT QL REPORTED: YES
FASTING STATUS PATIENT QL REPORTED: YES
GLUCOSE SERPL-MCNC: 104 MG/DL (ref 70–99)
HCO3 SERPL-SCNC: 25 MMOL/L (ref 22–29)
HCT VFR BLD AUTO: 48.7 % (ref 40–53)
HDLC SERPL-MCNC: 48 MG/DL
HGB BLD-MCNC: 17 G/DL (ref 13.3–17.7)
LDLC SERPL CALC-MCNC: 73 MG/DL
MCH RBC QN AUTO: 31.1 PG (ref 26.5–33)
MCHC RBC AUTO-ENTMCNC: 34.9 G/DL (ref 31.5–36.5)
MCV RBC AUTO: 89 FL (ref 78–100)
NONHDLC SERPL-MCNC: 88 MG/DL
PLATELET # BLD AUTO: 244 10E3/UL (ref 150–450)
POTASSIUM SERPL-SCNC: 4.4 MMOL/L (ref 3.4–5.3)
PROT SERPL-MCNC: 7.1 G/DL (ref 6.4–8.3)
RBC # BLD AUTO: 5.47 10E6/UL (ref 4.4–5.9)
SODIUM SERPL-SCNC: 140 MMOL/L (ref 135–145)
TRIGL SERPL-MCNC: 75 MG/DL
WBC # BLD AUTO: 7.8 10E3/UL (ref 4–11)

## 2024-10-23 PROCEDURE — G2211 COMPLEX E/M VISIT ADD ON: HCPCS | Performed by: PHYSICIAN ASSISTANT

## 2024-10-23 PROCEDURE — 93005 ELECTROCARDIOGRAM TRACING: CPT | Performed by: PHYSICIAN ASSISTANT

## 2024-10-23 PROCEDURE — 82248 BILIRUBIN DIRECT: CPT | Performed by: PHYSICIAN ASSISTANT

## 2024-10-23 PROCEDURE — 80053 COMPREHEN METABOLIC PANEL: CPT | Performed by: PHYSICIAN ASSISTANT

## 2024-10-23 PROCEDURE — 36415 COLL VENOUS BLD VENIPUNCTURE: CPT | Performed by: PHYSICIAN ASSISTANT

## 2024-10-23 PROCEDURE — 85027 COMPLETE CBC AUTOMATED: CPT | Performed by: PHYSICIAN ASSISTANT

## 2024-10-23 PROCEDURE — 80061 LIPID PANEL: CPT | Performed by: PHYSICIAN ASSISTANT

## 2024-10-23 PROCEDURE — 99214 OFFICE O/P EST MOD 30 MIN: CPT | Performed by: PHYSICIAN ASSISTANT

## 2024-10-23 ASSESSMENT — ENCOUNTER SYMPTOMS
EYE PAIN: 0
COLOR CHANGE: 0
SEIZURES: 0
BACK PAIN: 0
SHORTNESS OF BREATH: 0
ABDOMINAL PAIN: 0
FEVER: 0
COUGH: 0
SORE THROAT: 0
DYSURIA: 0
HEMATURIA: 0
VOMITING: 0
PALPITATIONS: 0
ARTHRALGIAS: 0
CHILLS: 0

## 2024-10-23 NOTE — H&P (VIEW-ONLY)
Preoperative Evaluation  [facility]  [address]  [place]  [facility] [address]  Phone: [phone]  Fax: [phone]  Primary Provider: Mal Ariza PA-C  Pre-op Performing Provider: Mal Ariza PA-C  Oct 23, 2024             10/18/2024   Surgical Information   What procedure is being done? Ankle arthroplasty    Facility or Hospital where procedure/surgery will be performed: [facility]    Who is doing the procedure / surgery? Dr. Menendez    Date of surgery / procedure: 11/15/24    Time of surgery / procedure: Early AM    Where do you plan to recover after surgery? at home with family        Patient-reported     Fax number for surgical facility: Note does not need to be faxed, will be available electronically in Epic.    Assessment & Plan     The proposed surgical procedure is considered INTERMEDIATE risk.    Pre-operative examination  Arthritis of right ankle  Christopher is a pleasant 59-year-old male who presents to the clinic today for a preop physical.  Christopher plans to undergo a right ankle arthroplasty on 11/15/2024.  8 years ago underwent repair of his right ankle over the last couple years he has been having ongoing pain.  He is otherwise feeling well and at his baseline.  He denies any chest pain, shortness of breath, lightheaded or dizziness.  Update CBC, CMP and EKG today  - CBC with platelets; Future  - Comprehensive metabolic panel (BMP + Alb, Alk Phos, ALT, AST, Total. Bili, TP); Future  - EKG 12-lead, tracing only    Essential hypertension  Blood pressure well-controlled at 134/82.  Continue amlodipine    Mixed hyperlipidemia  He was started on Lipitor 20 mg daily 4/24.  He has been taking this well without any side effects.  Today we will recheck a fasting lipid and LFTs.  - Lipid panel reflex to direct LDL Fasting; Future    The longitudinal plan of care for the diagnosis(es)/condition(s) as documented were addressed  during this visit. Due to the added complexity in care, I will continue to support Christopher in the subsequent management and with ongoing continuity of care.        - No identified additional risk factors other than previously addressed    Antiplatelet or Anticoagulation Medication Instructions   - Patient is on no antiplatelet or anticoagulation medications.    Additional Medication Instructions  Take all scheduled medications on the day of surgery   - Herbal medications and vitamins: DO NOT TAKE 7 days prior to surgery.   - ibuprofen (Advil, Motrin): DO NOT TAKE 7 day before surgery.     Recommendation  Approval given to proceed with proposed procedure, without further diagnostic evaluation.    Subjective   Christopher is a 59 year old, presenting for the following:  Pre-Op Exam (DOS 11/15/24 for R total ankle arthroplasty at [facility] with Dr. Menendez)          10/23/2024     8:39 AM   Additional Questions   Roomed by Claudia Kirby   Accompanied by none     HPI related to upcoming procedure: Jerome is a pleasant 59-year-old male that presents to the clinic today for a preop physical.  He plans undergo a right ankle arthroplasty on 11/15/2024.  He is otherwise feeling well and at his baseline.        10/18/2024   Pre-Op Questionnaire   Have you ever had a heart attack or stroke? No    Have you ever had surgery on your heart or blood vessels, such as a stent placement, a coronary artery bypass, or surgery on an artery in your head, neck, heart, or legs? No    Do you have chest pain with activity? No    Do you have a history of heart failure? No    Do you currently have a cold, bronchitis or symptoms of other infection? No    Do you have a cough, shortness of breath, or wheezing? No    Do you or anyone in your family have previous history of blood clots? No    Do you or does anyone in your family have a serious bleeding problem such as prolonged bleeding following surgeries or cuts? No    Have you ever had problems with anemia or  been told to take iron pills? No    Have you had any abnormal blood loss such as black, tarry or bloody stools? No    Have you ever had a blood transfusion? No    Are you willing to have a blood transfusion if it is medically needed before, during, or after your surgery? Yes    Have you or any of your relatives ever had problems with anesthesia? No    Do you have sleep apnea, excessive snoring or daytime drowsiness? No    Do you have any artifical heart valves or other implanted medical devices like a pacemaker, defibrillator, or continuous glucose monitor? No    Do you have artificial joints? No    Are you allergic to latex? No        Patient-reported         Patient Active Problem List    Diagnosis Date Noted     Obesity (BMI 35.0-39.9) with comorbidity (H) 12/07/2020     Priority: Medium     Essential hypertension 05/22/2020     Priority: Medium     Mixed hyperlipidemia 02/10/2020     Priority: Medium     Diverticulosis of colon without diverticulitis 07/05/2016     Priority: Medium     Noted on colonoscopy done at [facility] 4/29/2016 - repeat in 5 years.           Past Medical History:   Diagnosis Date     Hypertension      Past Surgical History:   Procedure Laterality Date     APPENDECTOMY       ARTHROSCOPY ANKLE Right 12/15/2016    Procedure: ANKLE ARTHROSCOPY;  Surgeon: Donny Hoyt DPM;  Location: [facility];  Service:      ARTHROSCOPY ELBOW Right 9/26/2023    Procedure: Right Elbow arthroscopy with removal loose bodies,;  Surgeon: Paulo Weiss MD;  Location: [facility] OR     ARTHROTOMY ANKLE Right 12/15/2016    Procedure: OPEN ARTHROTOMY, LATERAL ANKLE STABLIZATION WITH TENDON GRAFT, RIGHT ANKLE;  Surgeon: Donny Hoyt DPM;  Location: [facility];  Service:      HERNIA REPAIR       IRRIGATION AND DEBRIDEMENT UPPER EXTREMITY, COMBINED  9/26/2023    Procedure: Right elbow synovectomy and debridement;  Surgeon: Paulo Weiss MD;  Location: [facility] OR     OTHER SURGICAL HISTORY      OTHER SURGICAL  "HISTORYtelescopic bowel age 5     Current Outpatient Medications   Medication Sig Dispense Refill     amLODIPine (NORVASC) 10 MG tablet Take 1 tablet (10 mg) by mouth daily 90 tablet 3     atorvastatin (LIPITOR) 20 MG tablet Take 1 tablet (20 mg) by mouth daily 90 tablet 3       No Known Allergies     Social History     Tobacco Use     Smoking status: Never     Passive exposure: Never     Smokeless tobacco: Never   Substance Use Topics     Alcohol use: Yes     Alcohol/week: 1.0 - 2.0 standard drink of alcohol     Comment: 1-2 / week     Family History   Problem Relation Age of Onset     Heart Disease Mother      Memory loss Mother      Heart Disease Father      Early Death Father      No Known Problems Sister      No Known Problems Son      Colon Cancer Maternal Grandmother      Colon Cancer Paternal Grandfather      History   Drug Use No           Review of Systems   Constitutional:  Negative for chills and fever.   HENT:  Negative for ear pain and sore throat.    Eyes:  Negative for pain and visual disturbance.   Respiratory:  Negative for cough and shortness of breath.    Cardiovascular:  Negative for chest pain and palpitations.   Gastrointestinal:  Negative for abdominal pain and vomiting.   Genitourinary:  Negative for dysuria and hematuria.   Musculoskeletal:  Negative for arthralgias and back pain.        Right ankle pain.      Skin:  Negative for color change and rash.   Neurological:  Negative for seizures and syncope.   All other systems reviewed and are negative.        Objective    /82 (BP Location: Right arm, Patient Position: Sitting, Cuff Size: Adult Regular)   Pulse 63   Temp 98.1  F (36.7  C) (Oral)   Resp 14   Ht 1.702 m (5' 7\")   Wt 98.9 kg (218 lb)   SpO2 96%   BMI 34.14 kg/m     Estimated body mass index is 34.14 kg/m  as calculated from the following:    Height as of this encounter: 1.702 m (5' 7\").    Weight as of this encounter: 98.9 kg (218 lb).  Physical Exam  Vitals and " nursing note reviewed.   Constitutional:       General: He is not in acute distress.     Appearance: Normal appearance. He is well-developed and well-groomed. He is not ill-appearing or toxic-appearing.   HENT:      Head: Normocephalic and atraumatic.      Right Ear: Tympanic membrane, ear canal and external ear normal.      Left Ear: Tympanic membrane, ear canal and external ear normal.      Mouth/Throat:      Lips: Pink.      Mouth: Mucous membranes are moist.      Palate: No mass.      Pharynx: Oropharynx is clear. Uvula midline.      Tonsils: No tonsillar exudate or tonsillar abscesses.   Eyes:      General: Lids are normal.         Right eye: No discharge.         Left eye: No discharge.   Neck:      Trachea: Trachea normal.   Cardiovascular:      Rate and Rhythm: Normal rate and regular rhythm.      Heart sounds: S1 normal and S2 normal. No murmur heard.  Pulmonary:      Effort: Pulmonary effort is normal.      Breath sounds: Normal breath sounds and air entry.   Abdominal:      General: Bowel sounds are normal. There is no distension.      Palpations: Abdomen is soft.      Tenderness: There is no abdominal tenderness. There is no guarding or rebound.   Musculoskeletal:      Cervical back: Full passive range of motion without pain and neck supple.      Right lower leg: No edema.      Left lower leg: No edema.   Lymphadenopathy:      Cervical: No cervical adenopathy.   Skin:     General: Skin is warm and dry.      Findings: No lesion or rash.   Neurological:      General: No focal deficit present.      Mental Status: He is alert.      Cranial Nerves: No cranial nerve deficit.      Gait: Gait is intact.      Deep Tendon Reflexes:      Reflex Scores:       Patellar reflexes are 2+ on the right side and 2+ on the left side.  Psychiatric:         Attention and Perception: Attention normal.         Mood and Affect: Mood normal.         Speech: Speech normal.       Recent Labs   Lab Test 04/03/24  0802   HGB 17.0          POTASSIUM 4.4   CR 1.23*   A1C 5.6        Diagnostics  Recent Results (from the past 240 hours)   EKG 12-lead, tracing only    Collection Time: 10/23/24  9:03 AM   Result Value Ref Range    Systolic Blood Pressure  mmHg    Diastolic Blood Pressure  mmHg    Ventricular Rate 57 BPM    Atrial Rate 57 BPM    UT Interval 168 ms    QRS Duration 80 ms     ms    QTc 385 ms    P Axis 52 degrees    R AXIS 10 degrees    T Axis 3 degrees    Interpretation ECG       Sinus bradycardia  Otherwise normal ECG  When compared with ECG of 12-Dec-2016 13:43,  No significant change was found     CBC with platelets    Collection Time: 10/23/24  9:12 AM   Result Value Ref Range    WBC Count 7.8 4.0 - 11.0 10e3/uL    RBC Count 5.47 4.40 - 5.90 10e6/uL    Hemoglobin 17.0 13.3 - 17.7 g/dL    Hematocrit 48.7 40.0 - 53.0 %    MCV 89 78 - 100 fL    MCH 31.1 26.5 - 33.0 pg    MCHC 34.9 31.5 - 36.5 g/dL    RDW 11.8 10.0 - 15.0 %    Platelet Count 244 150 - 450 10e3/uL          Revised Cardiac Risk Index (RCRI)  The patient has the following serious cardiovascular risks for perioperative complications:   - No serious cardiac risks = 0 points     RCRI Interpretation: 0 points: Class I (very low risk - 0.4% complication rate)         Signed Electronically by: Mal Ariza PA-C  A copy of this evaluation report is provided to the requesting physician.

## 2024-10-23 NOTE — PROGRESS NOTES
Preoperative Evaluation  [facility]  [address]  [place]  [facility] [address]  Phone: [phone]  Fax: [phone]  Primary Provider: Mal Ariza PA-C  Pre-op Performing Provider: Mal Ariza PA-C  Oct 23, 2024             10/18/2024   Surgical Information   What procedure is being done? Ankle arthroplasty    Facility or Hospital where procedure/surgery will be performed: [facility]    Who is doing the procedure / surgery? Dr. Menendez    Date of surgery / procedure: 11/15/24    Time of surgery / procedure: Early AM    Where do you plan to recover after surgery? at home with family        Patient-reported     Fax number for surgical facility: Note does not need to be faxed, will be available electronically in Epic.    Assessment & Plan     The proposed surgical procedure is considered INTERMEDIATE risk.    Pre-operative examination  Arthritis of right ankle  Christopher is a pleasant 59-year-old male who presents to the clinic today for a preop physical.  Christopher plans to undergo a right ankle arthroplasty on 11/15/2024.  8 years ago underwent repair of his right ankle over the last couple years he has been having ongoing pain.  He is otherwise feeling well and at his baseline.  He denies any chest pain, shortness of breath, lightheaded or dizziness.  Update CBC, CMP and EKG today  - CBC with platelets; Future  - Comprehensive metabolic panel (BMP + Alb, Alk Phos, ALT, AST, Total. Bili, TP); Future  - EKG 12-lead, tracing only    Essential hypertension  Blood pressure well-controlled at 134/82.  Continue amlodipine    Mixed hyperlipidemia  He was started on Lipitor 20 mg daily 4/24.  He has been taking this well without any side effects.  Today we will recheck a fasting lipid and LFTs.  - Lipid panel reflex to direct LDL Fasting; Future    The longitudinal plan of care for the diagnosis(es)/condition(s) as documented were addressed  during this visit. Due to the added complexity in care, I will continue to support Christopher in the subsequent management and with ongoing continuity of care.        - No identified additional risk factors other than previously addressed    Antiplatelet or Anticoagulation Medication Instructions   - Patient is on no antiplatelet or anticoagulation medications.    Additional Medication Instructions  Take all scheduled medications on the day of surgery   - Herbal medications and vitamins: DO NOT TAKE 7 days prior to surgery.   - ibuprofen (Advil, Motrin): DO NOT TAKE 7 day before surgery.     Recommendation  Approval given to proceed with proposed procedure, without further diagnostic evaluation.    Subjective   Christopher is a 59 year old, presenting for the following:  Pre-Op Exam (DOS 11/15/24 for R total ankle arthroplasty at [facility] with Dr. Menendez)          10/23/2024     8:39 AM   Additional Questions   Roomed by Claudia Kirby   Accompanied by none     HPI related to upcoming procedure: Jerome is a pleasant 59-year-old male that presents to the clinic today for a preop physical.  He plans undergo a right ankle arthroplasty on 11/15/2024.  He is otherwise feeling well and at his baseline.        10/18/2024   Pre-Op Questionnaire   Have you ever had a heart attack or stroke? No    Have you ever had surgery on your heart or blood vessels, such as a stent placement, a coronary artery bypass, or surgery on an artery in your head, neck, heart, or legs? No    Do you have chest pain with activity? No    Do you have a history of heart failure? No    Do you currently have a cold, bronchitis or symptoms of other infection? No    Do you have a cough, shortness of breath, or wheezing? No    Do you or anyone in your family have previous history of blood clots? No    Do you or does anyone in your family have a serious bleeding problem such as prolonged bleeding following surgeries or cuts? No    Have you ever had problems with anemia or  been told to take iron pills? No    Have you had any abnormal blood loss such as black, tarry or bloody stools? No    Have you ever had a blood transfusion? No    Are you willing to have a blood transfusion if it is medically needed before, during, or after your surgery? Yes    Have you or any of your relatives ever had problems with anesthesia? No    Do you have sleep apnea, excessive snoring or daytime drowsiness? No    Do you have any artifical heart valves or other implanted medical devices like a pacemaker, defibrillator, or continuous glucose monitor? No    Do you have artificial joints? No    Are you allergic to latex? No        Patient-reported         Patient Active Problem List    Diagnosis Date Noted     Obesity (BMI 35.0-39.9) with comorbidity (H) 12/07/2020     Priority: Medium     Essential hypertension 05/22/2020     Priority: Medium     Mixed hyperlipidemia 02/10/2020     Priority: Medium     Diverticulosis of colon without diverticulitis 07/05/2016     Priority: Medium     Noted on colonoscopy done at [facility] 4/29/2016 - repeat in 5 years.           Past Medical History:   Diagnosis Date     Hypertension      Past Surgical History:   Procedure Laterality Date     APPENDECTOMY       ARTHROSCOPY ANKLE Right 12/15/2016    Procedure: ANKLE ARTHROSCOPY;  Surgeon: Donny Hoyt DPM;  Location: [facility];  Service:      ARTHROSCOPY ELBOW Right 9/26/2023    Procedure: Right Elbow arthroscopy with removal loose bodies,;  Surgeon: Paulo Weiss MD;  Location: [facility] OR     ARTHROTOMY ANKLE Right 12/15/2016    Procedure: OPEN ARTHROTOMY, LATERAL ANKLE STABLIZATION WITH TENDON GRAFT, RIGHT ANKLE;  Surgeon: Donny Hoyt DPM;  Location: [facility];  Service:      HERNIA REPAIR       IRRIGATION AND DEBRIDEMENT UPPER EXTREMITY, COMBINED  9/26/2023    Procedure: Right elbow synovectomy and debridement;  Surgeon: Paulo Weiss MD;  Location: [facility] OR     OTHER SURGICAL HISTORY      OTHER SURGICAL  "HISTORYtelescopic bowel age 5     Current Outpatient Medications   Medication Sig Dispense Refill     amLODIPine (NORVASC) 10 MG tablet Take 1 tablet (10 mg) by mouth daily 90 tablet 3     atorvastatin (LIPITOR) 20 MG tablet Take 1 tablet (20 mg) by mouth daily 90 tablet 3       No Known Allergies     Social History     Tobacco Use     Smoking status: Never     Passive exposure: Never     Smokeless tobacco: Never   Substance Use Topics     Alcohol use: Yes     Alcohol/week: 1.0 - 2.0 standard drink of alcohol     Comment: 1-2 / week     Family History   Problem Relation Age of Onset     Heart Disease Mother      Memory loss Mother      Heart Disease Father      Early Death Father      No Known Problems Sister      No Known Problems Son      Colon Cancer Maternal Grandmother      Colon Cancer Paternal Grandfather      History   Drug Use No           Review of Systems   Constitutional:  Negative for chills and fever.   HENT:  Negative for ear pain and sore throat.    Eyes:  Negative for pain and visual disturbance.   Respiratory:  Negative for cough and shortness of breath.    Cardiovascular:  Negative for chest pain and palpitations.   Gastrointestinal:  Negative for abdominal pain and vomiting.   Genitourinary:  Negative for dysuria and hematuria.   Musculoskeletal:  Negative for arthralgias and back pain.        Right ankle pain.      Skin:  Negative for color change and rash.   Neurological:  Negative for seizures and syncope.   All other systems reviewed and are negative.        Objective    /82 (BP Location: Right arm, Patient Position: Sitting, Cuff Size: Adult Regular)   Pulse 63   Temp 98.1  F (36.7  C) (Oral)   Resp 14   Ht 1.702 m (5' 7\")   Wt 98.9 kg (218 lb)   SpO2 96%   BMI 34.14 kg/m     Estimated body mass index is 34.14 kg/m  as calculated from the following:    Height as of this encounter: 1.702 m (5' 7\").    Weight as of this encounter: 98.9 kg (218 lb).  Physical Exam  Vitals and " nursing note reviewed.   Constitutional:       General: He is not in acute distress.     Appearance: Normal appearance. He is well-developed and well-groomed. He is not ill-appearing or toxic-appearing.   HENT:      Head: Normocephalic and atraumatic.      Right Ear: Tympanic membrane, ear canal and external ear normal.      Left Ear: Tympanic membrane, ear canal and external ear normal.      Mouth/Throat:      Lips: Pink.      Mouth: Mucous membranes are moist.      Palate: No mass.      Pharynx: Oropharynx is clear. Uvula midline.      Tonsils: No tonsillar exudate or tonsillar abscesses.   Eyes:      General: Lids are normal.         Right eye: No discharge.         Left eye: No discharge.   Neck:      Trachea: Trachea normal.   Cardiovascular:      Rate and Rhythm: Normal rate and regular rhythm.      Heart sounds: S1 normal and S2 normal. No murmur heard.  Pulmonary:      Effort: Pulmonary effort is normal.      Breath sounds: Normal breath sounds and air entry.   Abdominal:      General: Bowel sounds are normal. There is no distension.      Palpations: Abdomen is soft.      Tenderness: There is no abdominal tenderness. There is no guarding or rebound.   Musculoskeletal:      Cervical back: Full passive range of motion without pain and neck supple.      Right lower leg: No edema.      Left lower leg: No edema.   Lymphadenopathy:      Cervical: No cervical adenopathy.   Skin:     General: Skin is warm and dry.      Findings: No lesion or rash.   Neurological:      General: No focal deficit present.      Mental Status: He is alert.      Cranial Nerves: No cranial nerve deficit.      Gait: Gait is intact.      Deep Tendon Reflexes:      Reflex Scores:       Patellar reflexes are 2+ on the right side and 2+ on the left side.  Psychiatric:         Attention and Perception: Attention normal.         Mood and Affect: Mood normal.         Speech: Speech normal.       Recent Labs   Lab Test 04/03/24  0802   HGB 17.0          POTASSIUM 4.4   CR 1.23*   A1C 5.6        Diagnostics  Recent Results (from the past 240 hours)   EKG 12-lead, tracing only    Collection Time: 10/23/24  9:03 AM   Result Value Ref Range    Systolic Blood Pressure  mmHg    Diastolic Blood Pressure  mmHg    Ventricular Rate 57 BPM    Atrial Rate 57 BPM    [place] Interval 168 ms    QRS Duration 80 ms     ms    QTc 385 ms    P Axis 52 degrees    R AXIS 10 degrees    T Axis 3 degrees    Interpretation ECG       Sinus bradycardia  Otherwise normal ECG  When compared with ECG of 12-Dec-2016 13:43,  No significant change was found     CBC with platelets    Collection Time: 10/23/24  9:12 AM   Result Value Ref Range    WBC Count 7.8 4.0 - 11.0 10e3/uL    RBC Count 5.47 4.40 - 5.90 10e6/uL    Hemoglobin 17.0 13.3 - 17.7 g/dL    Hematocrit 48.7 40.0 - 53.0 %    MCV 89 78 - 100 fL    MCH 31.1 26.5 - 33.0 pg    MCHC 34.9 31.5 - 36.5 g/dL    RDW 11.8 10.0 - 15.0 %    Platelet Count 244 150 - 450 10e3/uL          Revised Cardiac Risk Index (RCRI)  The patient has the following serious cardiovascular risks for perioperative complications:   - No serious cardiac risks = 0 points     RCRI Interpretation: 0 points: Class I (very low risk - 0.4% complication rate)         Signed Electronically by: Mal Ariza PA-C  A copy of this evaluation report is provided to the requesting physician.

## 2024-10-24 LAB
ATRIAL RATE - MUSE: 57 BPM
DIASTOLIC BLOOD PRESSURE - MUSE: NORMAL MMHG
INTERPRETATION ECG - MUSE: NORMAL
P AXIS - MUSE: 52 DEGREES
PR INTERVAL - MUSE: 168 MS
QRS DURATION - MUSE: 80 MS
QT - MUSE: 396 MS
QTC - MUSE: 385 MS
R AXIS - MUSE: 10 DEGREES
SYSTOLIC BLOOD PRESSURE - MUSE: NORMAL MMHG
T AXIS - MUSE: 3 DEGREES
VENTRICULAR RATE- MUSE: 57 BPM

## 2024-10-24 PROCEDURE — 93010 ELECTROCARDIOGRAM REPORT: CPT | Performed by: STUDENT IN AN ORGANIZED HEALTH CARE EDUCATION/TRAINING PROGRAM

## 2024-11-15 ENCOUNTER — HOSPITAL ENCOUNTER (OUTPATIENT)
Facility: CLINIC | Age: 59
Discharge: HOME | End: 2024-11-16
Attending: ORTHOPAEDIC SURGERY | Admitting: ORTHOPAEDIC SURGERY
Payer: COMMERCIAL

## 2024-11-15 ENCOUNTER — ANESTHESIA EVENT (OUTPATIENT)
Dept: SURGERY | Facility: CLINIC | Age: 59
End: 2024-11-15
Payer: COMMERCIAL

## 2024-11-15 ENCOUNTER — ANESTHESIA (OUTPATIENT)
Dept: SURGERY | Facility: CLINIC | Age: 59
End: 2024-11-15
Payer: COMMERCIAL

## 2024-11-15 ENCOUNTER — APPOINTMENT (OUTPATIENT)
Dept: RADIOLOGY | Facility: CLINIC | Age: 59
End: 2024-11-15
Attending: ORTHOPAEDIC SURGERY
Payer: COMMERCIAL

## 2024-11-15 DIAGNOSIS — Z96.661 AFTERCARE FOLLOWING RIGHT ANKLE JOINT REPLACEMENT SURGERY: Primary | ICD-10-CM

## 2024-11-15 DIAGNOSIS — Z47.1 AFTERCARE FOLLOWING RIGHT ANKLE JOINT REPLACEMENT SURGERY: Primary | ICD-10-CM

## 2024-11-15 PROBLEM — Z96.669 AFTERCARE FOLLOWING ANKLE JOINT REPLACEMENT SURGERY: Status: ACTIVE | Noted: 2024-11-15

## 2024-11-15 LAB — GLUCOSE BLDC GLUCOMTR-MCNC: 144 MG/DL (ref 70–99)

## 2024-11-15 PROCEDURE — 250N000011 HC RX IP 250 OP 636: Performed by: ANESTHESIOLOGY

## 2024-11-15 PROCEDURE — 250N000011 HC RX IP 250 OP 636: Performed by: PHYSICIAN ASSISTANT

## 2024-11-15 PROCEDURE — 710N000010 HC RECOVERY PHASE 1, LEVEL 2, PER MIN: Performed by: ORTHOPAEDIC SURGERY

## 2024-11-15 PROCEDURE — 258N000003 HC RX IP 258 OP 636: Performed by: ANESTHESIOLOGY

## 2024-11-15 PROCEDURE — 258N000003 HC RX IP 258 OP 636: Performed by: PHYSICIAN ASSISTANT

## 2024-11-15 PROCEDURE — 999N000141 HC STATISTIC PRE-PROCEDURE NURSING ASSESSMENT: Performed by: ORTHOPAEDIC SURGERY

## 2024-11-15 PROCEDURE — 250N000009 HC RX 250: Performed by: ORTHOPAEDIC SURGERY

## 2024-11-15 PROCEDURE — 360N000084 HC SURGERY LEVEL 4 W/ FLUORO, PER MIN: Performed by: ORTHOPAEDIC SURGERY

## 2024-11-15 PROCEDURE — 272N000001 HC OR GENERAL SUPPLY STERILE: Performed by: ORTHOPAEDIC SURGERY

## 2024-11-15 PROCEDURE — 250N000011 HC RX IP 250 OP 636: Mod: JZ | Performed by: PHYSICIAN ASSISTANT

## 2024-11-15 PROCEDURE — 999N000180 XR SURGERY CARM FLUORO LESS THAN 5 MIN: Mod: TC

## 2024-11-15 PROCEDURE — 250N000009 HC RX 250: Performed by: ANESTHESIOLOGY

## 2024-11-15 PROCEDURE — 258N000003 HC RX IP 258 OP 636: Performed by: NURSE ANESTHETIST, CERTIFIED REGISTERED

## 2024-11-15 PROCEDURE — 250N000013 HC RX MED GY IP 250 OP 250 PS 637: Performed by: PHYSICIAN ASSISTANT

## 2024-11-15 PROCEDURE — 370N000017 HC ANESTHESIA TECHNICAL FEE, PER MIN: Performed by: ORTHOPAEDIC SURGERY

## 2024-11-15 PROCEDURE — 250N000013 HC RX MED GY IP 250 OP 250 PS 637: Performed by: ANESTHESIOLOGY

## 2024-11-15 PROCEDURE — 82962 GLUCOSE BLOOD TEST: CPT

## 2024-11-15 PROCEDURE — 250N000011 HC RX IP 250 OP 636: Performed by: NURSE ANESTHETIST, CERTIFIED REGISTERED

## 2024-11-15 PROCEDURE — 250N000025 HC SEVOFLURANE, PER MIN: Performed by: ORTHOPAEDIC SURGERY

## 2024-11-15 PROCEDURE — C1776 JOINT DEVICE (IMPLANTABLE): HCPCS | Performed by: ORTHOPAEDIC SURGERY

## 2024-11-15 PROCEDURE — 250N000009 HC RX 250: Performed by: NURSE ANESTHETIST, CERTIFIED REGISTERED

## 2024-11-15 DEVICE — SALTO TALARIS. INSERT. SIZE 2.                                    RIGHT. TH8
Type: IMPLANTABLE DEVICE | Site: ANKLE | Status: FUNCTIONAL
Brand: SALTO TALARIS TOTAL ANKLE PROSTHESIS

## 2024-11-15 DEVICE — TALAR DOME, FLAT CUT, SIZE 2, RIGHT
Type: IMPLANTABLE DEVICE | Site: ANKLE | Status: FUNCTIONAL
Brand: SALTO TALARIS®

## 2024-11-15 RX ORDER — ACETAMINOPHEN 325 MG/1
975 TABLET ORAL ONCE
Status: COMPLETED | OUTPATIENT
Start: 2024-11-15 | End: 2024-11-15

## 2024-11-15 RX ORDER — FENTANYL CITRATE 50 UG/ML
50 INJECTION, SOLUTION INTRAMUSCULAR; INTRAVENOUS EVERY 5 MIN PRN
Status: DISCONTINUED | OUTPATIENT
Start: 2024-11-15 | End: 2024-11-15 | Stop reason: HOSPADM

## 2024-11-15 RX ORDER — CELECOXIB 100 MG/1
100 CAPSULE ORAL 2 TIMES DAILY
Status: DISCONTINUED | OUTPATIENT
Start: 2024-11-15 | End: 2024-11-16 | Stop reason: HOSPADM

## 2024-11-15 RX ORDER — HYDROMORPHONE HCL IN WATER/PF 6 MG/30 ML
0.4 PATIENT CONTROLLED ANALGESIA SYRINGE INTRAVENOUS EVERY 5 MIN PRN
Status: DISCONTINUED | OUTPATIENT
Start: 2024-11-15 | End: 2024-11-15 | Stop reason: HOSPADM

## 2024-11-15 RX ORDER — ONDANSETRON 4 MG/1
4 TABLET, ORALLY DISINTEGRATING ORAL EVERY 30 MIN PRN
Status: DISCONTINUED | OUTPATIENT
Start: 2024-11-15 | End: 2024-11-15 | Stop reason: HOSPADM

## 2024-11-15 RX ORDER — OXYCODONE HYDROCHLORIDE 5 MG/1
5 TABLET ORAL EVERY 4 HOURS PRN
Status: DISCONTINUED | OUTPATIENT
Start: 2024-11-15 | End: 2024-11-16 | Stop reason: HOSPADM

## 2024-11-15 RX ORDER — CEFAZOLIN SODIUM 2 G/100ML
2 INJECTION, SOLUTION INTRAVENOUS EVERY 8 HOURS
Status: COMPLETED | OUTPATIENT
Start: 2024-11-15 | End: 2024-11-16

## 2024-11-15 RX ORDER — NALOXONE HYDROCHLORIDE 0.4 MG/ML
0.4 INJECTION, SOLUTION INTRAMUSCULAR; INTRAVENOUS; SUBCUTANEOUS
Status: DISCONTINUED | OUTPATIENT
Start: 2024-11-15 | End: 2024-11-16 | Stop reason: HOSPADM

## 2024-11-15 RX ORDER — DEXAMETHASONE SODIUM PHOSPHATE 4 MG/ML
4 INJECTION, SOLUTION INTRA-ARTICULAR; INTRALESIONAL; INTRAMUSCULAR; INTRAVENOUS; SOFT TISSUE
Status: CANCELLED | OUTPATIENT
Start: 2024-11-15

## 2024-11-15 RX ORDER — CELECOXIB 200 MG/1
200 CAPSULE ORAL DAILY
Qty: 14 CAPSULE | Refills: 0 | Status: SHIPPED | OUTPATIENT
Start: 2024-11-15 | End: 2024-11-29

## 2024-11-15 RX ORDER — OXYCODONE HYDROCHLORIDE 5 MG/1
10 TABLET ORAL EVERY 4 HOURS PRN
Status: CANCELLED | OUTPATIENT
Start: 2024-11-15

## 2024-11-15 RX ORDER — ONDANSETRON 2 MG/ML
4 INJECTION INTRAMUSCULAR; INTRAVENOUS EVERY 30 MIN PRN
Status: CANCELLED | OUTPATIENT
Start: 2024-11-15

## 2024-11-15 RX ORDER — ONDANSETRON 2 MG/ML
4 INJECTION INTRAMUSCULAR; INTRAVENOUS EVERY 6 HOURS PRN
Status: DISCONTINUED | OUTPATIENT
Start: 2024-11-15 | End: 2024-11-16 | Stop reason: HOSPADM

## 2024-11-15 RX ORDER — SODIUM CHLORIDE, SODIUM LACTATE, POTASSIUM CHLORIDE, CALCIUM CHLORIDE 600; 310; 30; 20 MG/100ML; MG/100ML; MG/100ML; MG/100ML
INJECTION, SOLUTION INTRAVENOUS CONTINUOUS
Status: DISCONTINUED | OUTPATIENT
Start: 2024-11-15 | End: 2024-11-15 | Stop reason: HOSPADM

## 2024-11-15 RX ORDER — ROPIVACAINE HYDROCHLORIDE 5 MG/ML
INJECTION, SOLUTION EPIDURAL; INFILTRATION; PERINEURAL
Status: COMPLETED | OUTPATIENT
Start: 2024-11-15 | End: 2024-11-15

## 2024-11-15 RX ORDER — NALOXONE HYDROCHLORIDE 0.4 MG/ML
0.1 INJECTION, SOLUTION INTRAMUSCULAR; INTRAVENOUS; SUBCUTANEOUS
Status: CANCELLED | OUTPATIENT
Start: 2024-11-15

## 2024-11-15 RX ORDER — HYDROMORPHONE HCL IN WATER/PF 6 MG/30 ML
0.2 PATIENT CONTROLLED ANALGESIA SYRINGE INTRAVENOUS EVERY 5 MIN PRN
Status: DISCONTINUED | OUTPATIENT
Start: 2024-11-15 | End: 2024-11-15 | Stop reason: HOSPADM

## 2024-11-15 RX ORDER — ACETAMINOPHEN 325 MG/1
975 TABLET ORAL ONCE
Status: CANCELLED | OUTPATIENT
Start: 2024-11-15 | End: 2024-11-15

## 2024-11-15 RX ORDER — AMOXICILLIN 250 MG
1-2 CAPSULE ORAL 2 TIMES DAILY
Qty: 30 TABLET | Refills: 0 | Status: SHIPPED | OUTPATIENT
Start: 2024-11-15

## 2024-11-15 RX ORDER — ASPIRIN 325 MG
325 TABLET, DELAYED RELEASE (ENTERIC COATED) ORAL EVERY EVENING
Status: DISCONTINUED | OUTPATIENT
Start: 2024-11-15 | End: 2024-11-16 | Stop reason: HOSPADM

## 2024-11-15 RX ORDER — DEXAMETHASONE SODIUM PHOSPHATE 4 MG/ML
4 INJECTION, SOLUTION INTRA-ARTICULAR; INTRALESIONAL; INTRAMUSCULAR; INTRAVENOUS; SOFT TISSUE
Status: DISCONTINUED | OUTPATIENT
Start: 2024-11-15 | End: 2024-11-15 | Stop reason: HOSPADM

## 2024-11-15 RX ORDER — PROCHLORPERAZINE MALEATE 10 MG
10 TABLET ORAL EVERY 6 HOURS PRN
Status: DISCONTINUED | OUTPATIENT
Start: 2024-11-15 | End: 2024-11-16 | Stop reason: HOSPADM

## 2024-11-15 RX ORDER — FENTANYL CITRATE 50 UG/ML
50 INJECTION, SOLUTION INTRAMUSCULAR; INTRAVENOUS
Status: CANCELLED | OUTPATIENT
Start: 2024-11-15

## 2024-11-15 RX ORDER — FENTANYL CITRATE 50 UG/ML
25 INJECTION, SOLUTION INTRAMUSCULAR; INTRAVENOUS EVERY 5 MIN PRN
Status: DISCONTINUED | OUTPATIENT
Start: 2024-11-15 | End: 2024-11-15 | Stop reason: HOSPADM

## 2024-11-15 RX ORDER — OXYCODONE HYDROCHLORIDE 5 MG/1
5-10 TABLET ORAL EVERY 4 HOURS PRN
Qty: 12 TABLET | Refills: 0 | Status: SHIPPED | OUTPATIENT
Start: 2024-11-15

## 2024-11-15 RX ORDER — SODIUM CHLORIDE, SODIUM LACTATE, POTASSIUM CHLORIDE, CALCIUM CHLORIDE 600; 310; 30; 20 MG/100ML; MG/100ML; MG/100ML; MG/100ML
INJECTION, SOLUTION INTRAVENOUS CONTINUOUS
Status: DISCONTINUED | OUTPATIENT
Start: 2024-11-15 | End: 2024-11-16 | Stop reason: HOSPADM

## 2024-11-15 RX ORDER — LIDOCAINE 40 MG/G
CREAM TOPICAL
Status: DISCONTINUED | OUTPATIENT
Start: 2024-11-15 | End: 2024-11-15

## 2024-11-15 RX ORDER — HYDROMORPHONE HCL IN WATER/PF 6 MG/30 ML
0.2 PATIENT CONTROLLED ANALGESIA SYRINGE INTRAVENOUS
Status: DISCONTINUED | OUTPATIENT
Start: 2024-11-15 | End: 2024-11-16 | Stop reason: HOSPADM

## 2024-11-15 RX ORDER — OXYCODONE HYDROCHLORIDE 5 MG/1
10 TABLET ORAL EVERY 4 HOURS PRN
Status: DISCONTINUED | OUTPATIENT
Start: 2024-11-15 | End: 2024-11-16 | Stop reason: HOSPADM

## 2024-11-15 RX ORDER — ONDANSETRON 2 MG/ML
4 INJECTION INTRAMUSCULAR; INTRAVENOUS EVERY 30 MIN PRN
Status: DISCONTINUED | OUTPATIENT
Start: 2024-11-15 | End: 2024-11-15 | Stop reason: HOSPADM

## 2024-11-15 RX ORDER — ACETAMINOPHEN 325 MG/1
650 TABLET ORAL EVERY 6 HOURS
Qty: 100 TABLET | Refills: 0 | Status: SHIPPED | OUTPATIENT
Start: 2024-11-15

## 2024-11-15 RX ORDER — AMLODIPINE BESYLATE 10 MG/1
10 TABLET ORAL DAILY
Status: DISCONTINUED | OUTPATIENT
Start: 2024-11-16 | End: 2024-11-16 | Stop reason: HOSPADM

## 2024-11-15 RX ORDER — LIDOCAINE 40 MG/G
CREAM TOPICAL
Status: DISCONTINUED | OUTPATIENT
Start: 2024-11-15 | End: 2024-11-16 | Stop reason: HOSPADM

## 2024-11-15 RX ORDER — HYDROMORPHONE HCL IN WATER/PF 6 MG/30 ML
0.4 PATIENT CONTROLLED ANALGESIA SYRINGE INTRAVENOUS
Status: DISCONTINUED | OUTPATIENT
Start: 2024-11-15 | End: 2024-11-16 | Stop reason: HOSPADM

## 2024-11-15 RX ORDER — NALOXONE HYDROCHLORIDE 0.4 MG/ML
0.1 INJECTION, SOLUTION INTRAMUSCULAR; INTRAVENOUS; SUBCUTANEOUS
Status: DISCONTINUED | OUTPATIENT
Start: 2024-11-15 | End: 2024-11-15 | Stop reason: HOSPADM

## 2024-11-15 RX ORDER — NALOXONE HYDROCHLORIDE 0.4 MG/ML
0.2 INJECTION, SOLUTION INTRAMUSCULAR; INTRAVENOUS; SUBCUTANEOUS
Status: DISCONTINUED | OUTPATIENT
Start: 2024-11-15 | End: 2024-11-16 | Stop reason: HOSPADM

## 2024-11-15 RX ORDER — FLUMAZENIL 0.1 MG/ML
0.2 INJECTION, SOLUTION INTRAVENOUS
Status: DISCONTINUED | OUTPATIENT
Start: 2024-11-15 | End: 2024-11-15

## 2024-11-15 RX ORDER — AMOXICILLIN 250 MG
1 CAPSULE ORAL 2 TIMES DAILY
Status: DISCONTINUED | OUTPATIENT
Start: 2024-11-15 | End: 2024-11-16 | Stop reason: HOSPADM

## 2024-11-15 RX ORDER — ONDANSETRON 4 MG/1
4 TABLET, ORALLY DISINTEGRATING ORAL EVERY 30 MIN PRN
Status: CANCELLED | OUTPATIENT
Start: 2024-11-15

## 2024-11-15 RX ORDER — POLYETHYLENE GLYCOL 3350 17 G/17G
17 POWDER, FOR SOLUTION ORAL DAILY
Status: DISCONTINUED | OUTPATIENT
Start: 2024-11-16 | End: 2024-11-16 | Stop reason: HOSPADM

## 2024-11-15 RX ORDER — ACETAMINOPHEN 325 MG/1
650 TABLET ORAL EVERY 6 HOURS
Status: DISCONTINUED | OUTPATIENT
Start: 2024-11-15 | End: 2024-11-16 | Stop reason: HOSPADM

## 2024-11-15 RX ORDER — ATORVASTATIN CALCIUM 10 MG/1
20 TABLET, FILM COATED ORAL EVERY EVENING
Status: DISCONTINUED | OUTPATIENT
Start: 2024-11-15 | End: 2024-11-16 | Stop reason: HOSPADM

## 2024-11-15 RX ORDER — OXYCODONE HYDROCHLORIDE 5 MG/1
5 TABLET ORAL EVERY 4 HOURS PRN
Status: CANCELLED | OUTPATIENT
Start: 2024-11-15

## 2024-11-15 RX ORDER — BISACODYL 10 MG
10 SUPPOSITORY, RECTAL RECTAL DAILY PRN
Status: DISCONTINUED | OUTPATIENT
Start: 2024-11-18 | End: 2024-11-16 | Stop reason: HOSPADM

## 2024-11-15 RX ORDER — CEFAZOLIN SODIUM/WATER 2 G/20 ML
2 SYRINGE (ML) INTRAVENOUS
Status: COMPLETED | OUTPATIENT
Start: 2024-11-15 | End: 2024-11-15

## 2024-11-15 RX ORDER — NALOXONE HYDROCHLORIDE 0.4 MG/ML
0.1 INJECTION, SOLUTION INTRAMUSCULAR; INTRAVENOUS; SUBCUTANEOUS
Status: DISCONTINUED | OUTPATIENT
Start: 2024-11-15 | End: 2024-11-15

## 2024-11-15 RX ORDER — ONDANSETRON 4 MG/1
4 TABLET, ORALLY DISINTEGRATING ORAL EVERY 6 HOURS PRN
Status: DISCONTINUED | OUTPATIENT
Start: 2024-11-15 | End: 2024-11-16 | Stop reason: HOSPADM

## 2024-11-15 RX ORDER — MAGNESIUM HYDROXIDE 1200 MG/15ML
LIQUID ORAL PRN
Status: DISCONTINUED | OUTPATIENT
Start: 2024-11-15 | End: 2024-11-15 | Stop reason: HOSPADM

## 2024-11-15 RX ORDER — DEXAMETHASONE SODIUM PHOSPHATE 10 MG/ML
INJECTION, SOLUTION INTRAMUSCULAR; INTRAVENOUS PRN
Status: DISCONTINUED | OUTPATIENT
Start: 2024-11-15 | End: 2024-11-15

## 2024-11-15 RX ORDER — PROPOFOL 10 MG/ML
INJECTION, EMULSION INTRAVENOUS PRN
Status: DISCONTINUED | OUTPATIENT
Start: 2024-11-15 | End: 2024-11-15

## 2024-11-15 RX ORDER — ASPIRIN 325 MG
325 TABLET, DELAYED RELEASE (ENTERIC COATED) ORAL DAILY
Qty: 42 TABLET | Refills: 0 | Status: SHIPPED | OUTPATIENT
Start: 2024-11-15

## 2024-11-15 RX ORDER — CEFAZOLIN SODIUM/WATER 2 G/20 ML
2 SYRINGE (ML) INTRAVENOUS SEE ADMIN INSTRUCTIONS
Status: DISCONTINUED | OUTPATIENT
Start: 2024-11-15 | End: 2024-11-15

## 2024-11-15 RX ORDER — ONDANSETRON 2 MG/ML
INJECTION INTRAMUSCULAR; INTRAVENOUS PRN
Status: DISCONTINUED | OUTPATIENT
Start: 2024-11-15 | End: 2024-11-15

## 2024-11-15 RX ORDER — FENTANYL CITRATE 50 UG/ML
25-100 INJECTION, SOLUTION INTRAMUSCULAR; INTRAVENOUS
Status: DISCONTINUED | OUTPATIENT
Start: 2024-11-15 | End: 2024-11-15

## 2024-11-15 RX ORDER — HYDROXYZINE HYDROCHLORIDE 25 MG/1
25 TABLET, FILM COATED ORAL EVERY 6 HOURS PRN
Qty: 30 TABLET | Refills: 0 | Status: SHIPPED | OUTPATIENT
Start: 2024-11-15

## 2024-11-15 RX ORDER — POLYETHYLENE GLYCOL 3350 17 G/17G
1 POWDER, FOR SOLUTION ORAL DAILY
Qty: 7 PACKET | Refills: 0 | Status: SHIPPED | OUTPATIENT
Start: 2024-11-15

## 2024-11-15 RX ORDER — HYDROXYZINE HYDROCHLORIDE 25 MG/1
25 TABLET, FILM COATED ORAL EVERY 6 HOURS PRN
Status: DISCONTINUED | OUTPATIENT
Start: 2024-11-15 | End: 2024-11-16 | Stop reason: HOSPADM

## 2024-11-15 RX ADMIN — FENTANYL CITRATE 50 MCG: 50 INJECTION INTRAMUSCULAR; INTRAVENOUS at 10:55

## 2024-11-15 RX ADMIN — MIDAZOLAM HYDROCHLORIDE 2 MG: 1 INJECTION, SOLUTION INTRAMUSCULAR; INTRAVENOUS at 08:36

## 2024-11-15 RX ADMIN — Medication 2 G: at 10:29

## 2024-11-15 RX ADMIN — DEXMEDETOMIDINE HYDROCHLORIDE 4 MCG: 100 INJECTION, SOLUTION INTRAVENOUS at 10:56

## 2024-11-15 RX ADMIN — ROPIVACAINE HYDROCHLORIDE 20 ML: 5 INJECTION, SOLUTION EPIDURAL; INFILTRATION; PERINEURAL at 08:59

## 2024-11-15 RX ADMIN — ACETAMINOPHEN 975 MG: 325 TABLET ORAL at 08:09

## 2024-11-15 RX ADMIN — LIDOCAINE HYDROCHLORIDE 50 MG: 10 INJECTION, SOLUTION EPIDURAL; INFILTRATION; INTRACAUDAL; PERINEURAL at 10:41

## 2024-11-15 RX ADMIN — FENTANYL CITRATE 50 MCG: 50 INJECTION INTRAMUSCULAR; INTRAVENOUS at 12:30

## 2024-11-15 RX ADMIN — ONDANSETRON 4 MG: 2 INJECTION INTRAMUSCULAR; INTRAVENOUS at 10:52

## 2024-11-15 RX ADMIN — DEXAMETHASONE SODIUM PHOSPHATE 10 MG: 10 INJECTION, SOLUTION INTRAMUSCULAR; INTRAVENOUS at 10:41

## 2024-11-15 RX ADMIN — FENTANYL CITRATE 50 MCG: 50 INJECTION INTRAMUSCULAR; INTRAVENOUS at 11:50

## 2024-11-15 RX ADMIN — CELECOXIB 100 MG: 100 CAPSULE ORAL at 20:22

## 2024-11-15 RX ADMIN — ATORVASTATIN CALCIUM 20 MG: 10 TABLET, FILM COATED ORAL at 20:22

## 2024-11-15 RX ADMIN — FENTANYL CITRATE 50 MCG: 50 INJECTION INTRAMUSCULAR; INTRAVENOUS at 10:40

## 2024-11-15 RX ADMIN — SODIUM CHLORIDE, POTASSIUM CHLORIDE, SODIUM LACTATE AND CALCIUM CHLORIDE: 600; 310; 30; 20 INJECTION, SOLUTION INTRAVENOUS at 10:31

## 2024-11-15 RX ADMIN — SENNOSIDES AND DOCUSATE SODIUM 1 TABLET: 50; 8.6 TABLET ORAL at 20:22

## 2024-11-15 RX ADMIN — SODIUM CHLORIDE, POTASSIUM CHLORIDE, SODIUM LACTATE AND CALCIUM CHLORIDE: 600; 310; 30; 20 INJECTION, SOLUTION INTRAVENOUS at 12:53

## 2024-11-15 RX ADMIN — CEFAZOLIN SODIUM 2 G: 2 INJECTION, SOLUTION INTRAVENOUS at 18:27

## 2024-11-15 RX ADMIN — ASPIRIN 325 MG: 325 TABLET ORAL at 20:22

## 2024-11-15 RX ADMIN — FENTANYL CITRATE 50 MCG: 50 INJECTION INTRAMUSCULAR; INTRAVENOUS at 12:21

## 2024-11-15 RX ADMIN — ACETAMINOPHEN 650 MG: 325 TABLET ORAL at 14:38

## 2024-11-15 RX ADMIN — ROPIVACAINE HYDROCHLORIDE 10 ML: 5 INJECTION, SOLUTION EPIDURAL; INFILTRATION; PERINEURAL at 09:03

## 2024-11-15 RX ADMIN — PROPOFOL 200 MG: 10 INJECTION, EMULSION INTRAVENOUS at 10:42

## 2024-11-15 RX ADMIN — SODIUM CHLORIDE, POTASSIUM CHLORIDE, SODIUM LACTATE AND CALCIUM CHLORIDE: 600; 310; 30; 20 INJECTION, SOLUTION INTRAVENOUS at 13:34

## 2024-11-15 RX ADMIN — FENTANYL CITRATE 50 MCG: 50 INJECTION INTRAMUSCULAR; INTRAVENOUS at 11:56

## 2024-11-15 RX ADMIN — FENTANYL CITRATE 100 MCG: 50 INJECTION INTRAMUSCULAR; INTRAVENOUS at 08:36

## 2024-11-15 RX ADMIN — ACETAMINOPHEN 650 MG: 325 TABLET ORAL at 20:22

## 2024-11-15 RX ADMIN — DEXMEDETOMIDINE HYDROCHLORIDE 12 MCG: 100 INJECTION, SOLUTION INTRAVENOUS at 10:59

## 2024-11-15 ASSESSMENT — ACTIVITIES OF DAILY LIVING (ADL)
ADLS_ACUITY_SCORE: 0

## 2024-11-15 NOTE — OP NOTE
Dr. Nathaniel Menendez dictating operative note on the patient Toni Mcclelland.  Date of surgery 11/15/2020 forward.    Operative diagnosis:    1.  Right posttraumatic ankle arthritis.    2.  Varus deformity right ankle.    Postoperative diagnosis:    Same    Operation:    1.  Right total ankle arthroplasty with a size 2 Yani Talaris size 2 tibia size 2    Flattop talus to 8 mm thick polyethylene..    Surgeon: Dr. Bashir Menendez graft     First Assistant: Bentley Turner, PAC    Procedure: Patient was brought the operating suite and placed in a supine position and general anesthetic was given out difficulties the right lower extremity was prepped and draped in the usual sterile fashion tourniquet right thigh was inflated 250 was mercury after exsanguination the leg with dependent drainage.    The curvilinear medial incision was made to incorporate the medial scar along the anterior part of the ankle joint the incision was brought down to the anterior tibial t.    The polyethylene spacer was a size 8 and seem to fit quite nicely the.  The endon and then the interval between the anterior tibial tendon and the EHL was carefully developed altered amount of scar tissue there so that had to be avoided and then the capsule was elevated off Antepar the ankle and neurovascular bundles was identified and carefully retracted laterally without injury.    The external alignment guide for the distal tibia and the proximal tibia was then aligned using his short pin in the tibial tubercle and the proper angle 9 mm of bone was cut from the distal tibia and this bone was removed without difficulty.  The there was quite a bit of depth anterior to posterior so the extended tray trial had to be used for this.  Care was taken not to injure the neurovascular bundle posteromedially.    The talar cut was then made at enough depth to make room for the a size 2 flattop talus again the bone was quite hard in this area thorough irrigation and cleaning of  the fluid so this all was done to minimize thermal necrosis.    The trials then size 2 tibia size 2 talus with 8 mm poly was then placed and found to be quite adequate so the tibia and the talus were drilled in the usual fashion and the implants were applied in the usual fashion as well.  The talar component did not seat completely despite and quite a bit of tapping and impacting with the commended device but the tibia was a nice press-fit there was good contact between the bone and the metal.    The gutters were then cleaned out to prevent any postoperative pain in that area and bone graft was placed in the anterior slot in the distal tibia using bone graft from the distal tibia resection site.    Again fluoroscopy was used to check the position of the implant was seen to be quite good so the wound was then thoroughly irrigated and closed layered fashion Bobby splint was applied tourniquet was released at about 1 hour the patient's capillary refill in toes during quite nicely the patient was then awakened brought recovery in stable condition.    Bentley Turner, PAC is essential provide intraoperative position patient wound irrigation layered closure and splint application.    This Dr. Nathaniel Menendez dictating on 11/15/2024

## 2024-11-15 NOTE — ANESTHESIA POSTPROCEDURE EVALUATION
Patient: Toni Mcclelland    Procedure: Procedure(s):  RIGHT TOTAL ANKLE ARTHROPLASTY       Anesthesia Type:  General    Note:  Disposition: Inpatient   Postop Pain Control: Uneventful            Sign Out: Well controlled pain   PONV: No   Neuro/Psych: Uneventful            Sign Out: Acceptable/Baseline neuro status   Airway/Respiratory: Uneventful            Sign Out: Acceptable/Baseline resp. status   CV/Hemodynamics: Uneventful            Sign Out: Acceptable CV status; No obvious hypovolemia; No obvious fluid overload   Other NRE: NONE   DID A NON-ROUTINE EVENT OCCUR? No           Last vitals:  Vitals Value Taken Time   /95 11/15/24 1300   Temp 36.7  C (98.1  F) 11/15/24 1250   Pulse 69 11/15/24 1304   Resp 24 11/15/24 1304   SpO2 97 % 11/15/24 1304   Vitals shown include unfiled device data.    Electronically Signed By: Kristan Lott MD  November 15, 2024  2:00 PM

## 2024-11-15 NOTE — ANESTHESIA PROCEDURE NOTES
"Adductor canal Procedure Note    Pre-Procedure   Staff -        Anesthesiologist:  Kristan Lott MD       Performed By: anesthesiologist       Location: pre-op       Procedure Start/Stop Times: 11/15/2024 9:03 AM and 11/15/2024 9:05 AM       Pre-Anesthestic Checklist: patient identified, IV checked, site marked, risks and benefits discussed, informed consent, monitors and equipment checked, pre-op evaluation, at physician/surgeon's request and post-op pain management  Timeout:       Correct Patient: Yes        Correct Procedure: Yes        Correct Site: Yes        Correct Position: Yes        Correct Laterality: Yes        Site Marked: Yes  Procedure Documentation  Procedure: Adductor canal       Laterality: right       Patient Position: supine       Skin prep: Chloraprep       Ultrasound guided       1. Ultrasound was used to identify targeted nerve, plexus, vascular marker, or fascial plane and place a needle adjacent to it in real-time.       2. Ultrasound was used to visualize the spread of anesthetic in close proximity to the above referenced structure.       3. A permanent image is entered into the patient's record.    Assessment/Narrative         The placement was negative for: blood aspirated, painful injection and site bleeding       Paresthesias: No.       Bolus given via needle..        Secured via.        Insertion/Infusion Method: Single Shot       Complications: none    Medication(s) Administered   Ropivacaine 0.5% PF (Infiltration) - Infiltration   10 mL - 11/15/2024 9:03:00 AM  Medication Administration Time: 11/15/2024 9:03 AM      FOR [facility] ([facility]) ONLY:   Pain Team Contact information: please page the Pain Team Via [employer]. Search \"Pain\". During daytime hours, please page the attending first. At night please page the resident first.      "

## 2024-11-15 NOTE — ANESTHESIA PREPROCEDURE EVALUATION
Anesthesia Pre-Procedure Evaluation    Patient: Toni Mcclelland   MRN: [id number] : 1965        Procedure : Procedure(s):  RIGHT TOTAL ANKLE ARTHROPLASTY          Past Medical History:   Diagnosis Date    Hypertension       Past Surgical History:   Procedure Laterality Date    APPENDECTOMY      ARTHROSCOPY ANKLE Right 12/15/2016    Procedure: ANKLE ARTHROSCOPY;  Surgeon: Donny Hoyt DPM;  Location: [facility] OR;  Service:     ARTHROSCOPY ELBOW Right 2023    Procedure: Right Elbow arthroscopy with removal loose bodies,;  Surgeon: Paulo Weiss MD;  Location: [facility] OR    ARTHROTOMY ANKLE Right 12/15/2016    Procedure: OPEN ARTHROTOMY, LATERAL ANKLE STABLIZATION WITH TENDON GRAFT, RIGHT ANKLE;  Surgeon: Donny Hoyt DPM;  Location: [facility] OR;  Service:     HERNIA REPAIR      IRRIGATION AND DEBRIDEMENT UPPER EXTREMITY, COMBINED  2023    Procedure: Right elbow synovectomy and debridement;  Surgeon: Paulo Weiss MD;  Location: [facility] OR    OTHER SURGICAL HISTORY      OTHER SURGICAL HISTORYtelescopic bowel age 5      No Known Allergies   Social History     Tobacco Use    Smoking status: Never     Passive exposure: Never    Smokeless tobacco: Never   Substance Use Topics    Alcohol use: Yes     Alcohol/week: 1.0 - 2.0 standard drink of alcohol     Comment: 1-2 / week      Wt Readings from Last 1 Encounters:   11/15/24 98.9 kg (218 lb)        Anesthesia Evaluation   Pt has had prior anesthetic.     No history of anesthetic complications       ROS/MED HX  ENT/Pulmonary:       Neurologic:       Cardiovascular:     (+)  hypertension- -   -  - -                                      METS/Exercise Tolerance:     Hematologic:       Musculoskeletal:       GI/Hepatic:       Renal/Genitourinary:       Endo:     (+)               Obesity,       Psychiatric/Substance Use:       Infectious Disease:       Malignancy:       Other:            Physical Exam    Airway        Mallampati: II    Neck ROM:  "full     Respiratory Devices and Support         Dental       (+) Minor Abnormalities - some fillings, tiny chips      Cardiovascular   cardiovascular exam normal          Pulmonary   pulmonary exam normal                OUTSIDE LABS:  CBC:   Lab Results   Component Value Date    WBC 7.8 10/23/2024    WBC 8.4 04/03/2024    HGB 17.0 10/23/2024    HGB 17.0 04/03/2024    HCT 48.7 10/23/2024    HCT 49.2 04/03/2024     10/23/2024     04/03/2024     BMP:   Lab Results   Component Value Date     10/23/2024     04/03/2024    POTASSIUM 4.4 10/23/2024    POTASSIUM 4.4 04/03/2024    CHLORIDE 105 10/23/2024    CHLORIDE 102 04/03/2024    CO2 25 10/23/2024    CO2 24 04/03/2024    BUN 11.4 10/23/2024    BUN 13.2 04/03/2024    CR 1.21 (H) 10/23/2024    CR 1.23 (H) 04/03/2024     (H) 10/23/2024    GLC 94 04/03/2024     COAGS: No results found for: \"PTT\", \"INR\", \"FIBR\"  POC: No results found for: \"BGM\", \"HCG\", \"HCGS\"  HEPATIC:   Lab Results   Component Value Date    ALBUMIN 4.5 10/23/2024    PROTTOTAL 7.1 10/23/2024    ALT 25 10/23/2024    AST 24 10/23/2024    ALKPHOS 77 10/23/2024    BILITOTAL 0.9 10/23/2024     OTHER:   Lab Results   Component Value Date    A1C 5.6 04/03/2024    KEVIN 9.7 10/23/2024       Anesthesia Plan    ASA Status:  2       Anesthesia Type: General.     - Airway: LMA              Consents    Anesthesia Plan(s) and associated risks, benefits, and realistic alternatives discussed. Questions answered and patient/representative(s) expressed understanding.     - Discussed: Risks, Benefits and Alternatives for the PROCEDURE were discussed     - Discussed with:  Patient      - Extended Intubation/Ventilatory Support Discussed: No.      - Patient is DNR/DNI Status: No     Use of blood products discussed: No .     Postoperative Care    Pain management: Peripheral nerve block (Single Shot).   PONV prophylaxis: Ondansetron (or other 5HT-3), Dexamethasone or Solumedrol     Comments:     " "          Kristan Lott MD    I have reviewed the pertinent notes and labs in the chart from the past 30 days and (re)examined the patient.  Any updates or changes from those notes are reflected in this note.               # Hypertension: Noted on problem list           # Obesity: Estimated body mass index is 34.14 kg/m  as calculated from the following:    Height as of this encounter: 1.702 m (5' 7\").    Weight as of this encounter: 98.9 kg (218 lb).             "

## 2024-11-15 NOTE — ANESTHESIA PROCEDURE NOTES
"Sciatic Procedure Note    Pre-Procedure   Staff -        Anesthesiologist:  Kristan Lott MD       Performed By: anesthesiologist       Location: pre-op       Procedure Start/Stop Times: 11/15/2024 8:59 AM and 11/15/2024 9:02 AM       Pre-Anesthestic Checklist: patient identified, IV checked, site marked, risks and benefits discussed, informed consent, monitors and equipment checked, pre-op evaluation, at physician/surgeon's request and post-op pain management  Timeout:       Correct Patient: Yes        Correct Procedure: Yes        Correct Site: Yes        Correct Position: Yes        Correct Laterality: Yes        Site Marked: Yes  Procedure Documentation  Procedure: Sciatic       Laterality: right       Patient Position: LLD       Skin prep: Chloraprep (popliteal approach).       Ultrasound guided       1. Ultrasound was used to identify targeted nerve, plexus, vascular marker, or fascial plane and place a needle adjacent to it in real-time.       2. Ultrasound was used to visualize the spread of anesthetic in close proximity to the above referenced structure.       3. A permanent image is entered into the patient's record.    Assessment/Narrative         The placement was negative for: blood aspirated, painful injection and site bleeding       Paresthesias: No.       Bolus given via needle..        Secured via.        Insertion/Infusion Method: Single Shot       Complications: none    Medication(s) Administered   Ropivacaine 0.5% PF (Infiltration) - Infiltration   20 mL - 11/15/2024 8:59:00 AM  Medication Administration Time: 11/15/2024 8:59 AM      FOR [facility] ([facility]) ONLY:   Pain Team Contact information: please page the Pain Team Via [employer]. Search \"Pain\". During daytime hours, please page the attending first. At night please page the resident first.      "

## 2024-11-15 NOTE — INTERVAL H&P NOTE
"I have reviewed the surgical (or preoperative) H&P that is linked to this encounter, and examined the patient. There are no significant changes    Clinical Conditions Present on Arrival:  Clinically Significant Risk Factors Present on Admission                       # Obesity: Estimated body mass index is 34.14 kg/m  as calculated from the following:    Height as of this encounter: 1.702 m (5' 7\").    Weight as of this encounter: 98.9 kg (218 lb).       "

## 2024-11-15 NOTE — ANESTHESIA CARE TRANSFER NOTE
Patient: Toni Mcclelland    Procedure: Procedure(s):  RIGHT TOTAL ANKLE ARTHROPLASTY       Diagnosis: Arthritis of right ankle [M19.071]  Diagnosis Additional Information: No value filed.    Anesthesia Type:   General     Note:    Oropharynx: oropharynx clear of all foreign objects and spontaneously breathing  Level of Consciousness: awake  Oxygen Supplementation: face mask  Level of Supplemental Oxygen (L/min / FiO2): 8  Independent Airway: airway patency satisfactory and stable  Dentition: dentition unchanged  Vital Signs Stable: post-procedure vital signs reviewed and stable  Report to RN Given: handoff report given  Patient transferred to: PACU    Handoff Report: Identifed the Patient, Identified the Reponsible Provider, Reviewed the pertinent medical history, Discussed the surgical course, Reviewed Intra-OP anesthesia mangement and issues during anesthesia, Set expectations for post-procedure period and Allowed opportunity for questions and acknowledgement of understanding  Vitals:  Vitals Value Taken Time   /92 11/15/24 1200   Temp 36.5  C (97.7  F) 11/15/24 1203   Pulse 76 11/15/24 1203   Resp 17 11/15/24 1203   SpO2 100 % 11/15/24 1203   Vitals shown include unfiled device data.    Electronically Signed By: JOSELUIS Vogt CRNA  November 15, 2024  12:04 PM

## 2024-11-15 NOTE — PHARMACY-ADMISSION MEDICATION HISTORY
Pharmacist Admission Medication History    Admission medication history is complete. The information provided in this note is only as accurate as the sources available at the time of the update.    Information Source(s): Patient and CareEverywhere/SureScripts via in-person    Pertinent Information:      Changes made to PTA medication list:  Added: None  Deleted: None  Changed: None    Allergies reviewed with patient and updates made in EHR: yes    Medication History Completed By: Don Us [facility] 11/15/2024 8:09 AM    PTA Med List   Medication Sig Last Dose/Taking    amLODIPine (NORVASC) 10 MG tablet Take 1 tablet (10 mg) by mouth daily 11/15/2024 at  6:30 AM    atorvastatin (LIPITOR) 20 MG tablet Take 1 tablet (20 mg) by mouth daily 11/14/2024 at  9:30 PM

## 2024-11-16 ENCOUNTER — APPOINTMENT (OUTPATIENT)
Dept: PHYSICAL THERAPY | Facility: CLINIC | Age: 59
End: 2024-11-16
Attending: ORTHOPAEDIC SURGERY
Payer: COMMERCIAL

## 2024-11-16 VITALS
HEART RATE: 71 BPM | BODY MASS INDEX: 34.21 KG/M2 | TEMPERATURE: 98.5 F | OXYGEN SATURATION: 96 % | SYSTOLIC BLOOD PRESSURE: 127 MMHG | WEIGHT: 218 LBS | RESPIRATION RATE: 18 BRPM | DIASTOLIC BLOOD PRESSURE: 68 MMHG | HEIGHT: 67 IN

## 2024-11-16 PROCEDURE — 250N000013 HC RX MED GY IP 250 OP 250 PS 637: Performed by: PHYSICIAN ASSISTANT

## 2024-11-16 PROCEDURE — 97161 PT EVAL LOW COMPLEX 20 MIN: CPT | Mod: GP

## 2024-11-16 PROCEDURE — 97116 GAIT TRAINING THERAPY: CPT | Mod: GP

## 2024-11-16 PROCEDURE — 250N000011 HC RX IP 250 OP 636: Mod: JZ | Performed by: PHYSICIAN ASSISTANT

## 2024-11-16 RX ADMIN — ACETAMINOPHEN 650 MG: 325 TABLET ORAL at 01:15

## 2024-11-16 RX ADMIN — AMLODIPINE BESYLATE 10 MG: 10 TABLET ORAL at 09:25

## 2024-11-16 RX ADMIN — CEFAZOLIN SODIUM 2 G: 2 INJECTION, SOLUTION INTRAVENOUS at 01:15

## 2024-11-16 RX ADMIN — ACETAMINOPHEN 650 MG: 325 TABLET ORAL at 09:25

## 2024-11-16 RX ADMIN — CELECOXIB 100 MG: 100 CAPSULE ORAL at 09:24

## 2024-11-16 ASSESSMENT — ACTIVITIES OF DAILY LIVING (ADL)
ADLS_ACUITY_SCORE: 0

## 2024-11-16 NOTE — DISCHARGE SUMMARY
[facility] Orthopedics Discharge Summary                                  [facility]     RUSSEL SEVERINO [id number]   Age: 59 year old  PCP: Mal Ariza, [phone] 1965     Date of Admission:  11/15/2024  Date of Discharge::  11/16/2024  Discharge Physician:  Nathan Corona PA-C    Code status:  Full Code    Admission Information:  Admission Diagnosis:  Arthritis of right ankle [M19.071]    Post-Operative Day: 1 Day Post-Op     Reason for admission:  The patient was admitted for the following:Procedure(s) (LRB):  RIGHT TOTAL ANKLE ARTHROPLASTY (Right)    Active Problems:    Aftercare following ankle joint replacement surgery      Allergies:  Patient has no known allergies.    Following the procedure noted above the patient was transferred to the post-op floor and started on:    Therapy:  physical therapy and occupational therapy  Anticoagulation Plan:  mg daily  for 42 days  Pain Management: scopainmedication: oxycodone and tylenol  Weight bearing status: Toe touch weight bearing for balance with assistance device       The patient was followed and co-managed by the hospitalist service during the inpatient treatment course  Complications:  None  Consultations:  None     Pertinent Labs   Lab Results: personally reviewed.     Recent Labs   Lab Test 10/23/24  0912 04/03/24  0802 04/04/23  0835   WBC 7.8 8.4 8.0   HGB 17.0 17.0 17.2   HCT 48.7 49.2 48.9   MCV 89 89 89    246 256    139 138          Discharge Information:  Condition at discharge: Stable  Discharge destination:  Discharged to home     Medications at discharge:  Current Discharge Medication List        START taking these medications    Details   acetaminophen (TYLENOL) 325 MG tablet Take 2 tablets (650 mg) by mouth every 6 hours.  Qty: 100 tablet, Refills: 0    Associated Diagnoses: Aftercare following right ankle joint replacement surgery      aspirin (ASA) 325 MG EC tablet Take 1 tablet (325 mg) by mouth  daily.  Qty: 42 tablet, Refills: 0    Associated Diagnoses: Aftercare following right ankle joint replacement surgery      celecoxib (CELEBREX) 200 MG capsule Take 1 capsule (200 mg) by mouth daily for 14 days. Do not take within 6 hours of ibuprofen (MOTRIN, ADVIL) or ketorolac (TORADOL) if prescribed.  Qty: 14 capsule, Refills: 0    Associated Diagnoses: Aftercare following right ankle joint replacement surgery      hydrOXYzine HCl (ATARAX) 25 MG tablet Take 1 tablet (25 mg) by mouth every 6 hours as needed for itching or anxiety (with pain, moderate pain).  Qty: 30 tablet, Refills: 0    Associated Diagnoses: Aftercare following right ankle joint replacement surgery      oxyCODONE (ROXICODONE) 5 MG tablet Take 1-2 tablets (5-10 mg) by mouth every 4 hours as needed for moderate to severe pain.  Qty: 12 tablet, Refills: 0    Associated Diagnoses: Aftercare following right ankle joint replacement surgery      polyethylene glycol (MIRALAX) 17 g packet Take 17 g by mouth daily.  Qty: 7 packet, Refills: 0    Associated Diagnoses: Aftercare following right ankle joint replacement surgery      senna-docusate (SENOKOT-S/PERICOLACE) 8.6-50 MG tablet Take 1-2 tablets by mouth 2 times daily. Take while on oral narcotics to prevent or treat constipation.  Qty: 30 tablet, Refills: 0    Comments: While taking narcotics  Associated Diagnoses: Aftercare following right ankle joint replacement surgery           CONTINUE these medications which have NOT CHANGED    Details   amLODIPine (NORVASC) 10 MG tablet Take 1 tablet (10 mg) by mouth daily  Qty: 90 tablet, Refills: 3    Associated Diagnoses: Essential hypertension      atorvastatin (LIPITOR) 20 MG tablet Take 1 tablet (20 mg) by mouth daily  Qty: 90 tablet, Refills: 3    Associated Diagnoses: Mixed hyperlipidemia                        Follow-Up Care:  Patient should be seen in the office in 14 days by the Orthopedic Surgeon/Physician Assistant.  Call [phone] for  appointment or questions.    Nathan Corona PA-C

## 2024-11-16 NOTE — PLAN OF CARE
Goal Outcome Evaluation:         Patient vital signs are at baseline: Yes  Patient able to ambulate as they were prior to admission or with assist devices provided by therapies during their stay:  No,  Reason:  Patient has weight restriction to RLE  Patient MUST void prior to discharge:  Yes  Patient able to tolerate oral intake:  Yes  Pain has adequate pain control using Oral analgesics:  Yes  Does patient have an identified :  Yes  Has goal D/C date and time been discussed with patient:  Yes

## 2024-11-16 NOTE — PROGRESS NOTES
Physical Therapy Discharge Summary    Reason for therapy discharge:    All goals and outcomes met, no further needs identified.    Progress towards therapy goal(s). See goals on Care Plan in Epic electronic health record for goal details.  Goals met    Therapy recommendation(s):    Continue with outpatient PT when appropriate per surgeon.

## 2024-11-16 NOTE — PROGRESS NOTES
11/16/24 0820   Appointment Info   Signing Clinician's Name / Credentials (PT) Zayra Clayton PT DPT [facility]   Quick Adds   Quick Adds Certification   Living Environment   People in Home spouse   Current Living Arrangements house   Home Accessibility stairs to enter home   Number of Stairs, Main Entrance 6   Stair Railings, Main Entrance railings on both sides of stairs   Transportation Anticipated family or friend will provide   Self-Care   Usual Activity Tolerance good   Current Activity Tolerance good   Fall history within last six months no   General Information   Onset of Illness/Injury or Date of Surgery 11/15/24   Referring Physician Dr. Bashir Menendez   Patient/Family Therapy Goals Statement (PT) return home   Pertinent History of Current Problem (include personal factors and/or comorbidities that impact the POC) Pt admitted on 11/15/24 for R TAA.   Existing Precautions/Restrictions weight bearing   Weight-Bearing Status - RLE toe touch weight-bearing   Cognition   Affect/Mental Status (Cognition) WNL   Orientation Status (Cognition) oriented x 4   Follows Commands (Cognition) WNL   Range of Motion (ROM)   Range of Motion ROM deficits secondary to surgical procedure   Bed Mobility   Comment, (Bed Mobility) Mod I   Transfers   Maintains Weight-bearing Status (Transfers) able to maintain   Comment, (Transfers) Sit <> Stand & SBA with B crutches/R TTWB   Gait/Stairs (Locomotion)   [place] Level (Gait) supervision   Assistive Device (Gait) crutches, axillary   Distance in Feet (Gait) 10 ft   Pattern (Gait) swing-through   Clinical Impression   Criteria for Skilled Therapeutic Intervention Yes, treatment indicated   PT Diagnosis (PT) impaired functional mobility   Influenced by the following impairments weakness   Functional limitations due to impairments ambulation, transfers, stairs   Clinical Presentation (PT Evaluation Complexity) stable   Clinical Presentation Rationale pt presents as medically diagnosed    Clinical Decision Making (Complexity) low complexity   Planned Therapy Interventions (PT) gait training;stair training   Risk & Benefits of therapy have been explained evaluation/treatment results reviewed;care plan/treatment goals reviewed;risks/benefits reviewed;current/potential barriers reviewed;participants voiced agreement with care plan;participants included;patient   PT Total Evaluation Time   PT Eval, Low Complexity Minutes (77840) 10   Therapy Certification   Start of care date 11/16/24   Certification date from 11/16/24   Certification date to 12/16/24   Medical Diagnosis R TAA   Physical Therapy Goals   PT Frequency One time eval and treatment only   PT Predicted Duration/Target Date for Goal Attainment 11/16/24   PT Goals Gait;Stairs   PT: Gait Modified independent;Crutches;150 feet;Goal Met;Completed   PT: Stairs Modified independent;4 stairs;Rail on both sides;Goal Met;Completed   Interventions   Interventions Quick Adds Gait Training   Gait Training   Gait Training Minutes (55658) 15   Symptoms Noted During/After Treatment (Gait Training) none   Treatment Detail/Skilled Intervention Pt amb in hallway with Crutches with Mod I x 200'.  Pt amb to maintain R LE FFWB throughout treatment session and amb with safe technique.  Educ provided re: removal of throw rugs/avoidance of water on floor to avoid falling.  Pt verbalized understanding.  STAIRS: pt instructed on amb up 4 steps using B rails and able to demonstrate technique with Mod I, safe technique and maintaining R LE FFWB. Pt left up in chair with needs within reach and RN notified of progress with PT.   PT Discharge Planning   PT Plan d/c from PT with goals met   PT Discharge Recommendation (DC Rec) home with assist   PT Rationale for DC Rec Pt lives in supportive environment with spouse available as needed to assist. Pt is safe with use of crutches and maintaining FFWB status with all mobility.   PT Brief overview of current status Pt amb 200'  with B crutches/FFWB R LE and up/down 4 steps with B rails/FFWB R LE with Mod I.  Amb to perform bed mobility/transfers with Mod I.   PT Equipment Needed at Discharge crutches, axillary   Physical Therapy Time and Intention   Timed Code Treatment Minutes 15   Total Session Time (sum of timed and untimed services) 25   [facility]  OUTPATIENT PHYSICAL THERAPY EVALUATION  PLAN OF TREATMENT FOR OUTPATIENT REHABILITATION  (COMPLETE FOR INITIAL CLAIMS ONLY)  Patient's Last Name, First Name, M.I.  YOB: 1965  Toni Mcclelland                        Provider's Name  [facility] Medical Record No.  [id number]                             Onset Date:  11/15/24   Start of Care Date:  11/16/24   Type:     _X_PT   ___OT   ___SLP Medical Diagnosis:  R TAA              PT Diagnosis:  impaired functional mobility Visits from SOC:  1     See note for plan of treatment, functional goals and certification details    I CERTIFY THE NEED FOR THESE SERVICES FURNISHED UNDER        THIS PLAN OF TREATMENT AND WHILE UNDER MY CARE     (Physician co-signature of this document indicates review and certification of the therapy plan).

## 2024-11-16 NOTE — PLAN OF CARE
Problem: Pain Acute  Goal: Optimal Pain Control and Function  Intervention: Develop Pain Management Plan    Problem: Adult Inpatient Plan of Care  Goal: Absence of Hospital-Acquired Illness or Injury  Intervention: Identify and Manage Fall Risk    Goal Outcome Evaluation: Pt A&O, VSS, Pain controlled w/scheduled tylenol, reposition/elevation and ice. Discharge summary and education completed at the bedside by this writer w/patient and wife.

## 2024-11-16 NOTE — PLAN OF CARE
Patient vital signs are at baseline: Yes  Patient able to ambulate as they were prior to admission or with assist devices provided by therapies during their stay:  No,  Reason:  pt refusing to ambulate   Patient MUST void prior to discharge:  Yes  Patient able to tolerate oral intake:  Yes  Pain has adequate pain control using Oral analgesics:  Yes  Does patient have an identified :  Yes  Has goal D/C date and time been discussed with patient:  Yes    Pt is A&Ox4, VSS on RA. Voiding adequately. Cast in place, CDI. CMS intact. Pt denies pain. Pt refusing to ambulate until he has crutches. Pt educated on the importance of early ambulation.

## 2024-11-16 NOTE — PROGRESS NOTES
"[facility] Orthopaedics Progress Note      Post-operative Day: 1 Day Post-Op    Procedure(s):  RIGHT TOTAL ANKLE ARTHROPLASTY      Subjective:  Patient sitting up in bed upon arrival. States he is doing well and has good charo  control. States he ahs questions about ambulating and PT. Otherwise feels ready to discharge.  Pain: minimal  Chest pain, SOB:  No      Objective:  Blood pressure 127/68, pulse 71, temperature 98.5  F (36.9  C), temperature source Oral, resp. rate 18, height 1.702 m (5' 7\"), weight 98.9 kg (218 lb), SpO2 96%.    Patient Vitals for the past 24 hrs:   BP Temp Temp src Pulse Resp SpO2   11/16/24 0736 127/68 98.5  F (36.9  C) Oral 71 18 96 %   11/16/24 0020 132/82 97.1  F (36.2  C) Oral 68 17 94 %   11/15/24 2132 130/80 97.7  F (36.5  C) Oral 68 18 94 %   11/15/24 1730 135/89 97.8  F (36.6  C) Oral 83 23 92 %   11/15/24 1630 129/75 97.9  F (36.6  C) Oral 64 29 94 %   11/15/24 1527 136/78 97.4  F (36.3  C) Oral 63 14 93 %   11/15/24 1500 128/71 -- Oral 66 22 92 %   11/15/24 1400 126/68 98.3  F (36.8  C) Oral 63 16 94 %   11/15/24 1330 (!) 140/89 98.7  F (37.1  C) Oral 70 18 95 %   11/15/24 1300 (!) 152/95 -- -- 69 18 96 %   11/15/24 1250 (!) 155/86 98.1  F (36.7  C) Core 76 20 95 %   11/15/24 1240 (!) 158/96 98.1  F (36.7  C) Core 74 19 97 %   11/15/24 1230 (!) 154/96 97.9  F (36.6  C) Core 79 16 98 %   11/15/24 1220 (!) 199/117 97.9  F (36.6  C) -- 79 18 --   11/15/24 1210 (!) 178/96 -- -- 77 22 99 %   11/15/24 1200 (!) 178/92 97.7  F (36.5  C) Core 75 16 100 %   11/15/24 1020 126/72 -- -- 52 -- 100 %   11/15/24 1010 119/81 -- -- 50 -- 99 %   11/15/24 1000 121/79 -- -- 52 16 99 %   11/15/24 0950 116/80 -- -- 53 -- 98 %   11/15/24 0940 106/76 -- -- 55 -- 99 %   11/15/24 0930 112/73 -- -- 55 -- 93 %   11/15/24 0920 119/77 -- -- 58 16 95 %   11/15/24 0910 124/78 -- -- 64 16 92 %   11/15/24 0900 124/74 -- -- 62 16 98 %   11/15/24 0850 117/70 -- -- 63 16 98 %   11/15/24 0845 106/65 -- -- 61 (!) 7 98 % "   11/15/24 0840 (!) 147/85 -- -- 65 20 100 %   11/15/24 0830 (!) 141/90 98.9  F (37.2  C) Temporal 64 13 98 %       Wt Readings from Last 4 Encounters:   11/15/24 98.9 kg (218 lb)   10/23/24 98.9 kg (218 lb)   04/03/24 97.5 kg (215 lb)   09/26/23 98.4 kg (217 lb)         Motor function, sensation, and circulation intact   Yes  Wound status: incisions are clean dry and intact. Yes, splint intact. No drainge seen.  Calf tenderness: Bilateral  No    Pertinent Labs   Lab Results: personally reviewed.     Recent Labs   Lab Test 10/23/24  0912 04/03/24  0802 04/04/23  0835   WBC 7.8 8.4 8.0   HGB 17.0 17.0 17.2   HCT 48.7 49.2 48.9   MCV 89 89 89    246 256    139 138       Plan: Anticoagulation protocol:  mg daily  x 42  days            Pain medications:  scopainmedication: oxycodone and tylenol            Weight bearing status:  toe touch weight bearing for balance with crutches            Disposition:  home today            Continue cares and rehabilitation     Report completed by:  Nathan Corona PA-C  Date: 11/16/2024  Time: 8:06 AM

## 2024-12-11 ENCOUNTER — DOCUMENTATION ONLY (OUTPATIENT)
Dept: PHYSICAL THERAPY | Facility: CLINIC | Age: 59
End: 2024-12-11
Payer: COMMERCIAL

## 2024-12-11 DIAGNOSIS — M19.071 ARTHRITIS OF RIGHT ANKLE: Primary | ICD-10-CM

## 2025-05-17 ENCOUNTER — HEALTH MAINTENANCE LETTER (OUTPATIENT)
Age: 60
End: 2025-05-17

## (undated) DEVICE — DRAPE BACK TABLE PADDED 60X90

## (undated) DEVICE — DRAPE ARTHROSCOPY SHOULDER BEACHCHAIR 29369

## (undated) DEVICE — PREP CHLORHEXIDINE 4% 4OZ (HIBICLENS) 57504

## (undated) DEVICE — BNDG COBAN 4"X5YDS STERILE

## (undated) DEVICE — PAD FLOOR SURGISAFE

## (undated) DEVICE — DECANTER BAG 2002S

## (undated) DEVICE — SOL NACL 0.9% 100ML BAG 2B1302

## (undated) DEVICE — SUCTION CANISTER MEDIVAC LINER 3000ML W/LID 65651-530

## (undated) DEVICE — CUSTOM PACK TOTAL KNEE SOP5BTKHEC

## (undated) DEVICE — TUBING ARTHROSCOPY PUMP ARTHREX AR-6410

## (undated) DEVICE — SOL WATER IRRIG 1000ML BOTTLE 2F7114

## (undated) DEVICE — DRAPE MINI C-ARM INSIGHT2 CF-5423

## (undated) DEVICE — SET OF 12 PINS +1 REAMER FOR SALTO ANKLE LJV-529T

## (undated) DEVICE — SUCTION MANIFOLD NEPTUNE 2 SYS 4 PORT 0702-020-000

## (undated) DEVICE — BUR ARTHREX COOLCUT ROUND 8 FLUTE 4.0MMX13CM AR-8400RBE

## (undated) DEVICE — GLOVE BIOGEL PI MICRO INDICATOR UNDERGLOVE SZ 7.0 48970

## (undated) DEVICE — BLADE SAW LG BONE TORNIER 70X13X1.27MM SAW6944T

## (undated) DEVICE — SUCTION MANIFOLD NEPTUNE 2 SYS 1 PORT 702-025-000

## (undated) DEVICE — GLOVE BIOGEL PI INDICATOR 8.0 LF 41680

## (undated) DEVICE — SOL ISOPROPYL RUBBING ALCOHOL USP 70% 4OZ HDX-20 I0020

## (undated) DEVICE — BNDG ESMARK 4" STERILE

## (undated) DEVICE — SOL NACL 0.9% IRRIG 1000ML BOTTLE 07138-09

## (undated) DEVICE — GOWN LG DISP 9515

## (undated) DEVICE — CAST PLASTER SPLINT XTRA FAST 5X30" 7392

## (undated) DEVICE — GLOVE BIOGEL PI ULTRATOUCH G SZ 7.0 42170

## (undated) DEVICE — BNDG ELASTIC 4" DBL LENGTH UNSTERILE 6611-14

## (undated) DEVICE — GLOVE BIOGEL INDICATOR 7.5 LF 41675

## (undated) DEVICE — DECANTER VIAL 2006S

## (undated) DEVICE — TOURNIQUET CUFF 18" STERILE

## (undated) DEVICE — SOL NACL 0.9% IRRIG 1000ML BOTTLE 2F7124

## (undated) DEVICE — SUTURE VICRYL+ 0 27IN CT-1 UND VCP260H

## (undated) DEVICE — DRSG XEROFORM 5X9" 8884431605

## (undated) DEVICE — SOL WATER IRRIG 1000ML BOTTLE 07139-09

## (undated) DEVICE — SOLIDIFIER FLD 3.2OZ  CL-FREE F/ BIOHZRD WASTE 3000ML CANIST

## (undated) DEVICE — GLOVE BIOGEL PI ULTRATOUCH G SZ 8.0 42180

## (undated) DEVICE — GOWN IMPERVIOUS BREATHABLE SMART XLG 89045

## (undated) DEVICE — SU ETHILON 3-0 PS-2 18" 1669H

## (undated) DEVICE — PREP SKIN SCRUB TRAY 4461A

## (undated) DEVICE — DRILL BIT L135 MM OD3 MM LJV528T

## (undated) DEVICE — BLADE SW 12.5MM BRASSELER THK.94MM 70MM I SD HUB STRL DISP

## (undated) DEVICE — BLANKET BAIR HUGGER LOWER BODY 42568

## (undated) DEVICE — ELECTRODE PATIENT RETURN ADULT L10 FT 2 PLATE CORD 0855C

## (undated) DEVICE — CAST PADDING 4" STERILE 9044S

## (undated) DEVICE — PACK SHOULDER

## (undated) DEVICE — ESU HOLSTER PLASTIC DISP E2400

## (undated) DEVICE — BUR ARTHREX COOLCUT SABRE 3.8MMX13CM AR-8380SR

## (undated) DEVICE — SUTURE VICRYL+ 2-0 27IN CT-1 UND VCP259H

## (undated) DEVICE — BLADE SAW INTEGRA 85X21X1.27MM SAW6945T

## (undated) DEVICE — GLOVE BIOGEL PI ULTRATOUCH G SZ 7.5 42175

## (undated) RX ORDER — GABAPENTIN 300 MG/1
CAPSULE ORAL
Status: DISPENSED
Start: 2023-09-26

## (undated) RX ORDER — ONDANSETRON 2 MG/ML
INJECTION INTRAMUSCULAR; INTRAVENOUS
Status: DISPENSED
Start: 2023-09-26

## (undated) RX ORDER — DEXAMETHASONE SODIUM PHOSPHATE 10 MG/ML
INJECTION, SOLUTION INTRAMUSCULAR; INTRAVENOUS
Status: DISPENSED
Start: 2023-09-26

## (undated) RX ORDER — ONDANSETRON 2 MG/ML
INJECTION INTRAMUSCULAR; INTRAVENOUS
Status: DISPENSED
Start: 2024-11-15

## (undated) RX ORDER — LIDOCAINE HYDROCHLORIDE 10 MG/ML
INJECTION, SOLUTION EPIDURAL; INFILTRATION; INTRACAUDAL; PERINEURAL
Status: DISPENSED
Start: 2024-11-15

## (undated) RX ORDER — PROPOFOL 10 MG/ML
INJECTION, EMULSION INTRAVENOUS
Status: DISPENSED
Start: 2024-11-15

## (undated) RX ORDER — EPHEDRINE SULFATE 50 MG/ML
INJECTION, SOLUTION INTRAMUSCULAR; INTRAVENOUS; SUBCUTANEOUS
Status: DISPENSED
Start: 2023-09-26

## (undated) RX ORDER — FENTANYL CITRATE 50 UG/ML
INJECTION, SOLUTION INTRAMUSCULAR; INTRAVENOUS
Status: DISPENSED
Start: 2024-11-15

## (undated) RX ORDER — FENTANYL CITRATE-0.9 % NACL/PF 10 MCG/ML
PLASTIC BAG, INJECTION (ML) INTRAVENOUS
Status: DISPENSED
Start: 2024-11-15

## (undated) RX ORDER — KETOROLAC TROMETHAMINE 30 MG/ML
INJECTION, SOLUTION INTRAMUSCULAR; INTRAVENOUS
Status: DISPENSED
Start: 2023-09-26

## (undated) RX ORDER — LIDOCAINE HYDROCHLORIDE AND EPINEPHRINE 10; 10 MG/ML; UG/ML
INJECTION, SOLUTION INFILTRATION; PERINEURAL
Status: DISPENSED
Start: 2023-09-26

## (undated) RX ORDER — FENTANYL CITRATE 50 UG/ML
INJECTION, SOLUTION INTRAMUSCULAR; INTRAVENOUS
Status: DISPENSED
Start: 2023-09-26

## (undated) RX ORDER — HYDROCODONE BITARTRATE AND ACETAMINOPHEN 5; 325 MG/1; MG/1
TABLET ORAL
Status: DISPENSED
Start: 2023-09-26

## (undated) RX ORDER — MAGNESIUM SULFATE HEPTAHYDRATE 40 MG/ML
INJECTION, SOLUTION INTRAVENOUS
Status: DISPENSED
Start: 2023-09-26

## (undated) RX ORDER — PROPOFOL 10 MG/ML
INJECTION, EMULSION INTRAVENOUS
Status: DISPENSED
Start: 2023-09-26

## (undated) RX ORDER — ACETAMINOPHEN 325 MG/1
TABLET ORAL
Status: DISPENSED
Start: 2023-09-26

## (undated) RX ORDER — DEXAMETHASONE SODIUM PHOSPHATE 10 MG/ML
INJECTION, EMULSION INTRAMUSCULAR; INTRAVENOUS
Status: DISPENSED
Start: 2024-11-15